# Patient Record
Sex: MALE | Race: BLACK OR AFRICAN AMERICAN | NOT HISPANIC OR LATINO | Employment: OTHER | URBAN - METROPOLITAN AREA
[De-identification: names, ages, dates, MRNs, and addresses within clinical notes are randomized per-mention and may not be internally consistent; named-entity substitution may affect disease eponyms.]

---

## 2018-08-02 ENCOUNTER — HOSPITAL ENCOUNTER (INPATIENT)
Facility: HOSPITAL | Age: 73
LOS: 4 days | Discharge: HOME HEALTH | DRG: 064 | End: 2018-08-06
Attending: EMERGENCY MEDICINE | Admitting: PSYCHIATRY & NEUROLOGY
Payer: MEDICARE

## 2018-08-02 DIAGNOSIS — B20 HIV INFECTION: ICD-10-CM

## 2018-08-02 DIAGNOSIS — I63.411 EMBOLIC STROKE INVOLVING RIGHT MIDDLE CEREBRAL ARTERY: ICD-10-CM
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
## 2018-08-02 DIAGNOSIS — R42 DIZZINESS: ICD-10-CM

## 2018-08-02 DIAGNOSIS — G93.6 CYTOTOXIC CEREBRAL EDEMA: ICD-10-CM

## 2018-08-02 DIAGNOSIS — I10 ESSENTIAL HYPERTENSION: ICD-10-CM

## 2018-08-02 DIAGNOSIS — I10 HYPERTENSION, UNSPECIFIED TYPE: ICD-10-CM

## 2018-08-02 DIAGNOSIS — R55 SYNCOPE: ICD-10-CM

## 2018-08-02 LAB
ALBUMIN SERPL BCP-MCNC: 3.3 G/DL
ALP SERPL-CCNC: 68 U/L
ALT SERPL W/O P-5'-P-CCNC: 12 U/L
ANION GAP SERPL CALC-SCNC: 9 MMOL/L
AST SERPL-CCNC: 17 U/L
BASOPHILS # BLD AUTO: 0.05 K/UL
BASOPHILS NFR BLD: 1 %
BILIRUB SERPL-MCNC: 0.3 MG/DL
BUN SERPL-MCNC: 9 MG/DL
CALCIUM SERPL-MCNC: 8.8 MG/DL
CHLORIDE SERPL-SCNC: 109 MMOL/L
CO2 SERPL-SCNC: 22 MMOL/L
CREAT SERPL-MCNC: 2 MG/DL
DIFFERENTIAL METHOD: ABNORMAL
EOSINOPHIL # BLD AUTO: 0.2 K/UL
EOSINOPHIL NFR BLD: 3.5 %
ERYTHROCYTE [DISTWIDTH] IN BLOOD BY AUTOMATED COUNT: 14.5 %
EST. GFR  (AFRICAN AMERICAN): 37.7 ML/MIN/1.73 M^2
EST. GFR  (NON AFRICAN AMERICAN): 32.6 ML/MIN/1.73 M^2
GLUCOSE SERPL-MCNC: 75 MG/DL
HCT VFR BLD AUTO: 45.2 %
HGB BLD-MCNC: 15.2 G/DL
IMM GRANULOCYTES # BLD AUTO: 0.01 K/UL
IMM GRANULOCYTES NFR BLD AUTO: 0.2 %
INR PPP: 1
LYMPHOCYTES # BLD AUTO: 2.2 K/UL
LYMPHOCYTES NFR BLD: 43.4 %
MCH RBC QN AUTO: 29.2 PG
MCHC RBC AUTO-ENTMCNC: 33.6 G/DL
MCV RBC AUTO: 87 FL
MONOCYTES # BLD AUTO: 0.6 K/UL
MONOCYTES NFR BLD: 10.7 %
NEUTROPHILS # BLD AUTO: 2.1 K/UL
NEUTROPHILS NFR BLD: 41.2 %
NRBC BLD-RTO: 0 /100 WBC
PLATELET # BLD AUTO: 263 K/UL
PMV BLD AUTO: 8.5 FL
POTASSIUM SERPL-SCNC: 3.9 MMOL/L
PROT SERPL-MCNC: 7.1 G/DL
PROTHROMBIN TIME: 10.6 SEC
RBC # BLD AUTO: 5.2 M/UL
SODIUM SERPL-SCNC: 140 MMOL/L
TROPONIN I SERPL DL<=0.01 NG/ML-MCNC: 0.02 NG/ML
WBC # BLD AUTO: 5.14 K/UL

## 2018-08-02 PROCEDURE — 85025 COMPLETE CBC W/AUTO DIFF WBC: CPT

## 2018-08-02 PROCEDURE — 99285 EMERGENCY DEPT VISIT HI MDM: CPT | Mod: ,,, | Performed by: EMERGENCY MEDICINE

## 2018-08-02 PROCEDURE — 93005 ELECTROCARDIOGRAM TRACING: CPT

## 2018-08-02 PROCEDURE — 84484 ASSAY OF TROPONIN QUANT: CPT

## 2018-08-02 PROCEDURE — 12000002 HC ACUTE/MED SURGE SEMI-PRIVATE ROOM

## 2018-08-02 PROCEDURE — 99285 EMERGENCY DEPT VISIT HI MDM: CPT | Mod: 25

## 2018-08-02 PROCEDURE — 85610 PROTHROMBIN TIME: CPT

## 2018-08-02 PROCEDURE — 93010 ELECTROCARDIOGRAM REPORT: CPT | Mod: ,,, | Performed by: INTERNAL MEDICINE

## 2018-08-02 PROCEDURE — 80053 COMPREHEN METABOLIC PANEL: CPT

## 2018-08-02 RX ORDER — MECLIZINE HYDROCHLORIDE CHEWABLE TABLETS 25 MG/1
12.5 TABLET, CHEWABLE ORAL 3 TIMES DAILY PRN
COMMUNITY

## 2018-08-03 PROBLEM — I63.411 EMBOLIC STROKE INVOLVING RIGHT MIDDLE CEREBRAL ARTERY: Status: ACTIVE | Noted: 2018-08-03

## 2018-08-03 PROBLEM — G93.6 CYTOTOXIC CEREBRAL EDEMA: Status: ACTIVE | Noted: 2018-08-03

## 2018-08-03 LAB
APTT BLDCRRT: 26 SEC
BILIRUB UR QL STRIP: NEGATIVE
CHOLEST SERPL-MCNC: 234 MG/DL
CHOLEST/HDLC SERPL: 5.3 {RATIO}
CK MB SERPL-MCNC: 0.6 NG/ML
CK MB SERPL-RTO: 0.8 %
CK SERPL-CCNC: 78 U/L
CLARITY UR REFRACT.AUTO: CLEAR
COLOR UR AUTO: YELLOW
DIASTOLIC DYSFUNCTION: NO
ESTIMATED AVG GLUCOSE: 105 MG/DL
ESTIMATED PA SYSTOLIC PRESSURE: 23.98
GLUCOSE UR QL STRIP: NEGATIVE
HBA1C MFR BLD HPLC: 5.3 %
HDLC SERPL-MCNC: 44 MG/DL
HDLC SERPL: 18.8 %
HGB UR QL STRIP: NEGATIVE
INR PPP: 1
KETONES UR QL STRIP: NEGATIVE
LDLC SERPL CALC-MCNC: 159 MG/DL
LEUKOCYTE ESTERASE UR QL STRIP: NEGATIVE
MAGNESIUM SERPL-MCNC: 2 MG/DL
MITRAL VALVE MOBILITY: NORMAL
NITRITE UR QL STRIP: NEGATIVE
NONHDLC SERPL-MCNC: 190 MG/DL
PH UR STRIP: 7 [PH] (ref 5–8)
PHOSPHATE SERPL-MCNC: 2.2 MG/DL
POTASSIUM SERPL-SCNC: 4 MMOL/L
PROT UR QL STRIP: NEGATIVE
PROTHROMBIN TIME: 10.6 SEC
RETIRED EF AND QEF - SEE NOTES: 53 (ref 55–65)
SP GR UR STRIP: 1.01 (ref 1–1.03)
TRIGL SERPL-MCNC: 155 MG/DL
TROPONIN I SERPL DL<=0.01 NG/ML-MCNC: <0.006 NG/ML
TSH SERPL DL<=0.005 MIU/L-ACNC: 0.95 UIU/ML
URN SPEC COLLECT METH UR: NORMAL
UROBILINOGEN UR STRIP-ACNC: NEGATIVE EU/DL

## 2018-08-03 PROCEDURE — 25000003 PHARM REV CODE 250: Performed by: NURSE PRACTITIONER

## 2018-08-03 PROCEDURE — 20600001 HC STEP DOWN PRIVATE ROOM

## 2018-08-03 PROCEDURE — 85730 THROMBOPLASTIN TIME PARTIAL: CPT

## 2018-08-03 PROCEDURE — 97161 PT EVAL LOW COMPLEX 20 MIN: CPT

## 2018-08-03 PROCEDURE — 99222 1ST HOSP IP/OBS MODERATE 55: CPT | Mod: ,,, | Performed by: NURSE PRACTITIONER

## 2018-08-03 PROCEDURE — 82550 ASSAY OF CK (CPK): CPT

## 2018-08-03 PROCEDURE — 92610 EVALUATE SWALLOWING FUNCTION: CPT

## 2018-08-03 PROCEDURE — A9585 GADOBUTROL INJECTION: HCPCS | Performed by: EMERGENCY MEDICINE

## 2018-08-03 PROCEDURE — 93306 TTE W/DOPPLER COMPLETE: CPT | Mod: 26,,, | Performed by: INTERNAL MEDICINE

## 2018-08-03 PROCEDURE — 83036 HEMOGLOBIN GLYCOSYLATED A1C: CPT

## 2018-08-03 PROCEDURE — 97112 NEUROMUSCULAR REEDUCATION: CPT

## 2018-08-03 PROCEDURE — G8996 SWALLOW CURRENT STATUS: HCPCS | Mod: CJ

## 2018-08-03 PROCEDURE — G8978 MOBILITY CURRENT STATUS: HCPCS | Mod: CJ

## 2018-08-03 PROCEDURE — 25500020 PHARM REV CODE 255: Performed by: EMERGENCY MEDICINE

## 2018-08-03 PROCEDURE — G8979 MOBILITY GOAL STATUS: HCPCS | Mod: CI

## 2018-08-03 PROCEDURE — 84443 ASSAY THYROID STIM HORMONE: CPT

## 2018-08-03 PROCEDURE — G8987 SELF CARE CURRENT STATUS: HCPCS | Mod: CK

## 2018-08-03 PROCEDURE — 92523 SPEECH SOUND LANG COMPREHEN: CPT

## 2018-08-03 PROCEDURE — A4216 STERILE WATER/SALINE, 10 ML: HCPCS | Performed by: NURSE PRACTITIONER

## 2018-08-03 PROCEDURE — G8988 SELF CARE GOAL STATUS: HCPCS | Mod: CJ

## 2018-08-03 PROCEDURE — G8997 SWALLOW GOAL STATUS: HCPCS | Mod: CH

## 2018-08-03 PROCEDURE — 84100 ASSAY OF PHOSPHORUS: CPT

## 2018-08-03 PROCEDURE — 97165 OT EVAL LOW COMPLEX 30 MIN: CPT

## 2018-08-03 PROCEDURE — G8980 MOBILITY D/C STATUS: HCPCS | Mod: CJ

## 2018-08-03 PROCEDURE — 99223 1ST HOSP IP/OBS HIGH 75: CPT | Mod: AI,,, | Performed by: PSYCHIATRY & NEUROLOGY

## 2018-08-03 PROCEDURE — 36415 COLL VENOUS BLD VENIPUNCTURE: CPT

## 2018-08-03 PROCEDURE — 93306 TTE W/DOPPLER COMPLETE: CPT

## 2018-08-03 PROCEDURE — 85610 PROTHROMBIN TIME: CPT

## 2018-08-03 PROCEDURE — 83735 ASSAY OF MAGNESIUM: CPT

## 2018-08-03 PROCEDURE — 80061 LIPID PANEL: CPT

## 2018-08-03 PROCEDURE — 81003 URINALYSIS AUTO W/O SCOPE: CPT

## 2018-08-03 PROCEDURE — 63600175 PHARM REV CODE 636 W HCPCS: Performed by: NURSE PRACTITIONER

## 2018-08-03 PROCEDURE — 84132 ASSAY OF SERUM POTASSIUM: CPT

## 2018-08-03 PROCEDURE — 84484 ASSAY OF TROPONIN QUANT: CPT

## 2018-08-03 PROCEDURE — 82553 CREATINE MB FRACTION: CPT

## 2018-08-03 RX ORDER — HEPARIN SODIUM 5000 [USP'U]/ML
5000 INJECTION, SOLUTION INTRAVENOUS; SUBCUTANEOUS EVERY 8 HOURS
Status: DISCONTINUED | OUTPATIENT
Start: 2018-08-03 | End: 2018-08-06 | Stop reason: HOSPADM

## 2018-08-03 RX ORDER — ASPIRIN 81 MG/1
81 TABLET ORAL DAILY
Status: DISCONTINUED | OUTPATIENT
Start: 2018-08-03 | End: 2018-08-06 | Stop reason: HOSPADM

## 2018-08-03 RX ORDER — GADOBUTROL 604.72 MG/ML
8 INJECTION INTRAVENOUS
Status: COMPLETED | OUTPATIENT
Start: 2018-08-03 | End: 2018-08-03

## 2018-08-03 RX ORDER — ACETAMINOPHEN 325 MG/1
650 TABLET ORAL EVERY 6 HOURS PRN
Status: DISCONTINUED | OUTPATIENT
Start: 2018-08-03 | End: 2018-08-06 | Stop reason: HOSPADM

## 2018-08-03 RX ORDER — ATORVASTATIN CALCIUM 40 MG/1
40 TABLET, FILM COATED ORAL DAILY
Qty: 30 TABLET | Refills: 1 | Status: SHIPPED | OUTPATIENT
Start: 2018-08-04 | End: 2018-08-06 | Stop reason: HOSPADM

## 2018-08-03 RX ORDER — SODIUM CHLORIDE 0.9 % (FLUSH) 0.9 %
3 SYRINGE (ML) INJECTION EVERY 8 HOURS
Status: DISCONTINUED | OUTPATIENT
Start: 2018-08-03 | End: 2018-08-06 | Stop reason: HOSPADM

## 2018-08-03 RX ORDER — ATORVASTATIN CALCIUM 20 MG/1
40 TABLET, FILM COATED ORAL DAILY
Status: DISCONTINUED | OUTPATIENT
Start: 2018-08-03 | End: 2018-08-06

## 2018-08-03 RX ORDER — SODIUM,POTASSIUM PHOSPHATES 280-250MG
1 POWDER IN PACKET (EA) ORAL ONCE
Status: DISCONTINUED | OUTPATIENT
Start: 2018-08-03 | End: 2018-08-06 | Stop reason: HOSPADM

## 2018-08-03 RX ORDER — FERROUS SULFATE 325(65) MG
325 TABLET, DELAYED RELEASE (ENTERIC COATED) ORAL DAILY
Status: DISCONTINUED | OUTPATIENT
Start: 2018-08-03 | End: 2018-08-06 | Stop reason: HOSPADM

## 2018-08-03 RX ORDER — DARUNAVIR 800 MG/1
800 TABLET, FILM COATED ORAL
Status: DISCONTINUED | OUTPATIENT
Start: 2018-08-03 | End: 2018-08-06 | Stop reason: HOSPADM

## 2018-08-03 RX ORDER — LABETALOL HYDROCHLORIDE 5 MG/ML
10 INJECTION, SOLUTION INTRAVENOUS EVERY 6 HOURS PRN
Status: DISCONTINUED | OUTPATIENT
Start: 2018-08-03 | End: 2018-08-06 | Stop reason: HOSPADM

## 2018-08-03 RX ORDER — TENOFOVIR DISOPROXIL FUMARATE 300 MG/1
300 TABLET, FILM COATED ORAL
Status: DISCONTINUED | OUTPATIENT
Start: 2018-08-03 | End: 2018-08-06 | Stop reason: HOSPADM

## 2018-08-03 RX ORDER — ASPIRIN 81 MG/1
81 TABLET ORAL DAILY
Refills: 0 | COMMUNITY
Start: 2018-08-04 | End: 2019-08-04

## 2018-08-03 RX ORDER — SULFAMETHOXAZOLE AND TRIMETHOPRIM 800; 160 MG/1; MG/1
1 TABLET ORAL
Status: DISCONTINUED | OUTPATIENT
Start: 2018-08-03 | End: 2018-08-06 | Stop reason: HOSPADM

## 2018-08-03 RX ADMIN — ACETAMINOPHEN 650 MG: 325 TABLET, FILM COATED ORAL at 10:08

## 2018-08-03 RX ADMIN — GADOBUTROL 8 ML: 604.72 INJECTION INTRAVENOUS at 01:08

## 2018-08-03 RX ADMIN — Medication 3 ML: at 06:08

## 2018-08-03 RX ADMIN — TENOFOVIR DISOPROXIL FUMARATE 300 MG: 300 TABLET, FILM COATED ORAL at 04:08

## 2018-08-03 RX ADMIN — Medication 3 ML: at 02:08

## 2018-08-03 RX ADMIN — ACETAMINOPHEN 650 MG: 325 TABLET, FILM COATED ORAL at 04:08

## 2018-08-03 RX ADMIN — HEPARIN SODIUM 5000 UNITS: 5000 INJECTION, SOLUTION INTRAVENOUS; SUBCUTANEOUS at 10:08

## 2018-08-03 RX ADMIN — HEPARIN SODIUM 5000 UNITS: 5000 INJECTION, SOLUTION INTRAVENOUS; SUBCUTANEOUS at 02:08

## 2018-08-03 NOTE — PLAN OF CARE
08/03/2018      Benjamin GOOD Garcia  [address] [facility] [address]          Vascular Neurology Dept.  [facility] [address] [facility] [address] [phone] after hours   Diagnosis:  Embolic stroke involving right middle cerebral artery                                Please evaluate and treat patient for his PT/OT and speech therapy needs.           __________________________  Faith Henry MD  08/03/2018

## 2018-08-03 NOTE — PT/OT/SLP EVAL
Speech Language Pathology Evaluation  Cognitive/Bedside Swallow    Patient Name:  Benjamin Zelaya   MRN:  [id number]  Admitting Diagnosis: Embolic stroke involving right middle cerebral artery    Recommendations:                  General Recommendations:  Dysphagia therapy and Cognitive-linguistic therapy  Diet recommendations:  Dental Soft, Thin   Aspiration Precautions: 1 bite/sip at a time, Alternating bites/sips, Check for pocketing/oral residue, Frequent oral care, HOB to 90 degrees, Meds whole 1 at a time, Remain upright 30 minutes post meal, Small bites/sips and Strict aspiration precautions Continue to monitor for signs and symptoms of aspiration and discontinue oral feeding should you notice any of the following: watery eyes, reddened facial area, wet vocal quality, increased work of breathing, change in respiratory status, increased congestion, coughing, fever, and.or confusion.   General Precautions: Standard, aspiration, fall, vision impaired  Communication strategies:  Go to room if call light pushed     History:     Past Medical History:   Diagnosis Date    Chronic kidney disease     HIV infection     HTN (hypertension) 10/24/2013    Stroke     2012       History reviewed. No pertinent surgical history.    Social History: Patient lives alone in home. Grandson lives across the street and checks on him frequently per Grandson report.     Chest X-Rays: 8/03/2018: No acute findings.    No significant change from prior study.    MRI 8/3/2018: Multiple acute to subacute infarcts in a right MCA distribution with partially occluding thrombus identified in the petrous segment of the right ICA.  Findings suggest embolic infarcts in the right MCA distribution.    Small focus of gyral enhancement in the right frontal lobe within the infarct, likely secondary to blood-barrier breakdown from recent infarct.    Advanced chronic microvascular ischemic change throughout the supratentorial white matter.    Prior diet:  "Regular, thin     Occupation/hobbies/homemaking: Retired, Teacher.    Subjective     SLP reviewed Pt with nurse, nurse reports Pt lethargic, has not had medications this am  Pt found awake and alert  He explains, "My Grandson said my speech was slurred so he brought me in"  He denies pain    Pain/Comfort:  · Pain Rating 1: 0/10  · Pain Rating Post-Intervention 1: 0/10    Objective:     Cognitive Status:    Attention Sustained attention deficit noted, decreased L attention   Perseveration Not present  Orientation Oriented x4  Memory Immediate Recall WFL for 3 related items, Delayed STM recall intact for recall of 3 related items post 3 minute filled delay  and long term recall intact  [employer] for ADL tasks provided 90% of the time, I'ly  Safety awareness :Decreased isnight into deficits, denial of deficits noted  Managing finances : Impaired, moderate. Patient requires MOD verbal cueing to complete fx word-problem for money mngt   Simple calculation intact for addition/subtraction (1 -digt)   Reasoning Numeric reasoning Time      Receptive Language:   Comprehension:      WFL for m/u commands and m/u YNQ this service day.     Pragmatics:    Eye contact decreased, L inattention    Expressive Language:  Verbal:    Repetition Words WFl and Phrases MIldly decerased accuracy   Naming Confrontation WFL, Convergent WFL and Single word responsive naming WFL  Sentence formulation no word-finding difficulty noted at the sentence level   Conversational speech No word-finding difficulty noted at the conversational level or during picture description tasks (cookie theft)  Nonverbal:   DNA      Motor Speech:  Mild Dysarthria as c/b decreased articulaory precision and breath support at the sentence level+     Voice:   Clear, mildly decreased intensity adn breath support     Visual-Spatial:  Impaired, L inattention, furhter assessment warranted    Reading:   Pt complete simple fx reading tasks with larger font WFL, " ongoing assessmnet warranted when reading glasses present     Written Expression:   DNA, reading glasses not present, ongoing assessment warranted when glasses present    Oral Musculature Evaluation  · Oral Musculature: facial asymmetry present  · Dentition: present and adequate  · Mucosal Quality: adequate  · Mandibular Strength and Mobility: impaired  · Oral Labial Strength and Mobility: impaired retraction  · Lingual Strength and Mobility: impaired left lateral movement, functional anterior elevation, functional strength  · Volitional Cough: elicited, strong   · Volitional Swallow: elicited, timely   · Voice Prior to PO Intake: clear, low intensity, Dysarthric     Bedside Swallow Eval:   Consistencies Assessed:  · Thin liquids cup edge sips thin liquids x5  · Puree tsp bites x3  · Solids bites of cracker x2     Oral Phase:   · Prolonged mastication    Pharyngeal Phase:   · no overt clinical signs/symptoms of aspiration    Compensatory Strategies  · None    Treatment: Patient and Grandson educated on SLP role, S/S aspiration, aspiration precautions, fall precautions (call light,) tx recs and diet recommendations. Patient and Grandson v/u. No additional questions noted. Whiteboard updated. Findings reviewed with MD team following session. Attempted to notify nurse.     Assessment:     Benjamin Zelaya is a 73 y.o. male with an SLP diagnosis of MIld Oral Dysphagia, Dysarthria and MIld Higher-level Cognitive Impairment. .  He would benefit from ongoing OP ST upon d/c from acute to continue to improve speech and safety awareness.     Goals:    SLP Goals        Problem: SLP Goal    Goal Priority Disciplines Outcome   SLP Goal     SLP Ongoing (interventions implemented as appropriate)   Description:  Speech Language Pathology Goals  Goals expected to be met by 8/10/18  1. Pt will tolerate dental soft diet with thin liquids w/o overt S/S aspiration, MOD I  2. Pt will tolerate trials of advanced textures with adequate oral  clearance and no overt S/S aspiration, MOD I  3. Pt will complete OMEs x10 ea with good effort 90% of attempts, MOD I, to improve labial/mandibular strength and coordination  4. Pt will complete sentence repetition tasks with 90% intelligibility, MOD I, to improve speech   5. Pt will complete fx math tasks with 90% accuracy, Supervision  6. Pt will complete basic visiospatial tasks with 90% accuracy, Supervision  7. Pt will complete further assessment of reading and writing skills   8. Educate Pt and family on safety precautions and S/S aspiration                         Plan:     · Patient to be seen:  4 x/week   · Plan of Care expires:  09/02/18  · Plan of Care reviewed with:  patient, grandchild(jj)   · SLP Follow-Up:  Yes       Discharge recommendations:  Discharge Facility/Level Of Care Needs: outpatient speech therapy   Barriers to Discharge:  None    Time Tracking:     SLP Treatment Date:   08/03/18  Speech Start Time:  1155  Speech Stop Time:  1225     Speech Total Time (min):  30 min    Billable Minutes: Eval 18  and Eval Swallow and Oral Function 12    RAFAEL Castillo, [facility]-SLP  Speech-Language Pathology  Pager: [phone]      08/03/2018

## 2018-08-03 NOTE — CONSULTS
[facility]  Physical Medicine & Rehab  Consult Note    Patient Name: Benjamin Zelaya  MRN: [id number]  Admission Date: 8/2/2018  Hospital Length of Stay: 0 days  Attending Physician: Faith Henry MD     Inpatient consult to Physical Medicine & Rehabilitation  Consult performed by: Chloe Bang NP  Consult requested by:  Faith Henry MD    Collaborating Physician: Barrett Fountain MD  Reason for Consult:  assess rehabilitation needs    Consults  Subjective:     Principal Problem: Embolic stroke involving right middle cerebral artery    HPI: Benjamin Zelaya is a 73-year-old male with PMHx of HTN, CKD, CVA with residual left-sided deficits, and HIV.  Patient presented to [facility] on 8/2/18 for general weakness, dizziness, and gait instability x 1 week with subsequent falls, slurred speech x 2 days, and new onset left-sided facial droop.  On arrival, evaluated by Vascular Neurology.  CTH without acute pathology.  Not tPA candidate 2/2 outside of treatment window.  MRI brain revealed acute/subacute R MCA infarct, suggestive of embolic etiology.      Functional History: Patient lives in [place], alone (family lives across the street), in a single story home with 3 steps to enter.  Prior to admission, he was (I) with ADLs, including driving, and mobility.  IRF for several months after previous stroke.  He is right handed.  DME: none.    Hospital Course: 8/3/18:  Therapy pending.     Past Medical History:   Diagnosis Date    Chronic kidney disease     HIV infection     HTN (hypertension) 10/24/2013    Stroke     2012     History reviewed. No pertinent surgical history.  Review of patient's allergies indicates:  No Known Allergies    Scheduled Medications:    aspirin  81 mg Oral Daily    atorvastatin  40 mg Oral Daily    darunavir ethanolate  800 mg Oral Daily with breakfast    ferrous sulfate  325 mg Oral Daily    heparin (porcine)  5,000 Units Subcutaneous Q8H    potassium, sodium phosphates   1 packet Oral Once    sodium chloride 0.9%  3 mL Intravenous Q8H    sulfamethoxazole-trimethoprim 800-160mg  1 tablet Oral Once per day on Mon Wed Fri    tenofovir  300 mg Oral Q48H       PRN Medications: acetaminophen, labetalol, sodium chloride 0.9%    Family History     Problem Relation (Age of Onset)    Hypertension Father, Brother, Paternal Grandmother        Social History Main Topics    Smoking status: Former Smoker     Packs/day: 0.25     Years: 20.00     Quit date: 9/18/1999    Smokeless tobacco: Never Used    Alcohol use No    Drug use: No    Sexual activity: Not Currently     Partners: Female, Male     Birth control/ protection: Condom     Review of Systems   Constitutional: Negative for chills, fatigue and fever.   HENT: Negative for drooling, hearing loss, trouble swallowing and voice change.    Eyes: Negative for pain and visual disturbance.   Respiratory: Negative for cough, shortness of breath and wheezing.    Cardiovascular: Negative for chest pain and palpitations.   Gastrointestinal: Negative for abdominal pain, nausea and vomiting.   Genitourinary: Negative for difficulty urinating and flank pain.   Musculoskeletal: Negative for arthralgias, back pain, myalgias and neck pain.   Skin: Negative for rash and wound.   Neurological: Positive for weakness. Negative for dizziness, numbness and headaches.   Psychiatric/Behavioral: Negative for agitation and hallucinations. The patient is not nervous/anxious.      Objective:     Vital Signs (Most Recent):  Temp: 98.7 °F (37.1 °C) (08/03/18 0721)  Pulse: (!) 56 (08/03/18 0721)  Resp: 18 (08/03/18 0721)  BP: (!) 171/89 (08/03/18 0721)  SpO2: (!) 93 % (08/03/18 0721)    Vital Signs (24h Range):  Temp:  [98.7 °F (37.1 °C)-99 °F (37.2 °C)] 98.7 °F (37.1 °C)  Pulse:  [56-79] 56  Resp:  [18-20] 18  SpO2:  [93 %-99 %] 93 %  BP: (125-179)/(78-96) 171/89     Body mass index is 24.81 kg/m².    Physical Exam   Constitutional: He is oriented to person, place,  and time. He appears well-developed and well-nourished. No distress.   HENT:   Head: Normocephalic and atraumatic.   Right Ear: External ear normal.   Left Ear: External ear normal.   Nose: Nose normal.   Eyes: Right eye exhibits no discharge. Left eye exhibits no discharge. No scleral icterus.   Neck: Normal range of motion.   Cardiovascular: Normal rate, regular rhythm and intact distal pulses.    Pulmonary/Chest: Effort normal. No respiratory distress. He has no wheezes.   Abdominal: Soft. He exhibits no distension. There is no tenderness.   Musculoskeletal: Normal range of motion. He exhibits no edema or tenderness.   Neurological: He is alert and oriented to person, place, and time. He exhibits normal muscle tone.   -  Mental Status:  AAOx3.  Follows commands.  Answers correct age and .  Recent and remote memory intact.  -  Speech and language:  no aphasia or dysarthria.    -  Vision:  no hemianopsia or ptosis.    -  Facial movement (CN VII): symmetrical.  -  Motor:  RUE: 5/5.  LUE: 4/5.  RLE: 5/5.  LLE: 5-/5.  -  Tone:  Increased LUE.  -  Sensory:  Intact to light touch and pin prick.   Skin: Skin is warm and dry. No rash noted.   Psychiatric: He has a normal mood and affect. His behavior is normal. Thought content normal.   Vitals reviewed.    Diagnostic Results:   Labs: Reviewed  X-Ray: Reviewed  CT: Reviewed  MRI: Reviewed    Assessment/Plan:     * Embolic stroke involving right middle cerebral artery    -  Presented for general weakness, dizziness, and gait instability x 1 week with subsequent falls, slurred speech x 2 days, and new onset left-sided facial droop  -  CTH without acute pathology--> not tPA candidate 2/2 outside of treatment window  -  MRI brain revealed acute/subacute R MCA infarct, suggestive of embolic etiology  See hospital course for functional, cognitive/speech/language, and nutrition/swallow status.      Recommendations  -  Encourage mobility, OOB in chair, and early ambulation as  appropriate   -  PT/OT evaluate and treat  -  SLP speech and cognitive evaluate and treat  -  Monitor mood and sleep disturbances  -  Establish consistent sleep-wake cycle  -  Monitor for bowel and bladder dysfunction  -  Monitor for shoulder pain and subluxation  -  Monitor for spasticity  -  DVT prophylaxis  -  Monitor for and prevent skin breakdown and pressure ulcers  · Early mobility, repositioning/weight shifting every 20-30 minutes when sitting, turn patient every 2 hours, proper mattress/overlay and chair cushioning, pressure relief/heel protector boots        Cytotoxic cerebral edema    -  2/2 stroke        HTN (hypertension)    -  Stroke risk factor        HIV infection    -  Stroke risk factor  -  On home meds        Therapy evaluations pending.  Will follow progress for final post-acute/rehab recommendation.    Thank you for your consult.     ANIKET Valdez  Department of Physical Medicine & Rehab  [facility]

## 2018-08-03 NOTE — SUBJECTIVE & OBJECTIVE
Past Medical History:   Diagnosis Date    Chronic kidney disease     HIV infection     HTN (hypertension) 10/24/2013    Stroke     2012     History reviewed. No pertinent surgical history.  Review of patient's allergies indicates:  No Known Allergies    Scheduled Medications:    aspirin  81 mg Oral Daily    atorvastatin  40 mg Oral Daily    darunavir ethanolate  800 mg Oral Daily with breakfast    ferrous sulfate  325 mg Oral Daily    heparin (porcine)  5,000 Units Subcutaneous Q8H    potassium, sodium phosphates  1 packet Oral Once    sodium chloride 0.9%  3 mL Intravenous Q8H    sulfamethoxazole-trimethoprim 800-160mg  1 tablet Oral Once per day on Mon Wed Fri    tenofovir  300 mg Oral Q48H       PRN Medications: acetaminophen, labetalol, sodium chloride 0.9%    Family History     Problem Relation (Age of Onset)    Hypertension Father, Brother, Paternal Grandmother        Social History Main Topics    Smoking status: Former Smoker     Packs/day: 0.25     Years: 20.00     Quit date: 9/18/1999    Smokeless tobacco: Never Used    Alcohol use No    Drug use: No    Sexual activity: Not Currently     Partners: Female, Male     Birth control/ protection: Condom     Review of Systems   Constitutional: Negative for chills, fatigue and fever.   HENT: Negative for drooling, hearing loss, trouble swallowing and voice change.    Eyes: Negative for pain and visual disturbance.   Respiratory: Negative for cough, shortness of breath and wheezing.    Cardiovascular: Negative for chest pain and palpitations.   Gastrointestinal: Negative for abdominal pain, nausea and vomiting.   Genitourinary: Negative for difficulty urinating and flank pain.   Musculoskeletal: Negative for arthralgias, back pain, myalgias and neck pain.   Skin: Negative for rash and wound.   Neurological: Positive for weakness. Negative for dizziness, numbness and headaches.   Psychiatric/Behavioral: Negative for agitation and hallucinations. The  patient is not nervous/anxious.      Objective:     Vital Signs (Most Recent):  Temp: 98.7 °F (37.1 °C) (18)  Pulse: (!) 56 (18)  Resp: 18 (18)  BP: (!) 171/89 (18)  SpO2: (!) 93 % (18)    Vital Signs (24h Range):  Temp:  [98.7 °F (37.1 °C)-99 °F (37.2 °C)] 98.7 °F (37.1 °C)  Pulse:  [56-79] 56  Resp:  [18-20] 18  SpO2:  [93 %-99 %] 93 %  BP: (125-179)/(78-96) 171/89     Body mass index is 24.81 kg/m².    Physical Exam   Constitutional: He is oriented to person, place, and time. He appears well-developed and well-nourished. No distress.   HENT:   Head: Normocephalic and atraumatic.   Right Ear: External ear normal.   Left Ear: External ear normal.   Nose: Nose normal.   Eyes: Right eye exhibits no discharge. Left eye exhibits no discharge. No scleral icterus.   Neck: Normal range of motion.   Cardiovascular: Normal rate, regular rhythm and intact distal pulses.    Pulmonary/Chest: Effort normal. No respiratory distress. He has no wheezes.   Abdominal: Soft. He exhibits no distension. There is no tenderness.   Musculoskeletal: Normal range of motion. He exhibits no edema or tenderness.   Neurological: He is alert and oriented to person, place, and time. He exhibits normal muscle tone.   -  Mental Status:  AAOx3.  Follows commands.  Answers correct age and .  Recent and remote memory intact.  -  Speech and language:  no aphasia or dysarthria.    -  Vision:  no hemianopsia or ptosis.    -  Facial movement (CN VII): symmetrical.  -  Motor:  RUE: 5/5.  LUE: 4/5.  RLE: 5/5.  LLE: 5-/5.  -  Tone:  Increased LUE.  -  Sensory:  Intact to light touch and pin prick.   Skin: Skin is warm and dry. No rash noted.   Psychiatric: He has a normal mood and affect. His behavior is normal. Thought content normal.   Vitals reviewed.    NEUROLOGICAL EXAMINATION:     MENTAL STATUS   Oriented to person, place, and time.       Diagnostic Results:   Labs: Reviewed  X-Ray:  Reviewed  CT: Reviewed  MRI: Reviewed

## 2018-08-03 NOTE — ASSESSMENT & PLAN NOTE
-  Presented for general weakness, dizziness, and gait instability x 1 week with subsequent falls, slurred speech x 2 days, and new onset left-sided facial droop  -  CTH without acute pathology--> not tPA candidate 2/2 outside of treatment window  -  MRI brain revealed acute/subacute R MCA infarct, suggestive of embolic etiology  See hospital course for functional, cognitive/speech/language, and nutrition/swallow status.      Recommendations  -  Encourage mobility, OOB in chair, and early ambulation as appropriate   -  PT/OT evaluate and treat  -  SLP speech and cognitive evaluate and treat  -  Monitor mood and sleep disturbances  -  Establish consistent sleep-wake cycle  -  Monitor for bowel and bladder dysfunction  -  Monitor for shoulder pain and subluxation  -  Monitor for spasticity  -  DVT prophylaxis  -  Monitor for and prevent skin breakdown and pressure ulcers  · Early mobility, repositioning/weight shifting every 20-30 minutes when sitting, turn patient every 2 hours, proper mattress/overlay and chair cushioning, pressure relief/heel protector boots

## 2018-08-03 NOTE — ED PROVIDER NOTES
Encounter Date: 8/2/2018    SCRIBE #1 NOTE: I, Rodrigo Richter, am scribing for, and in the presence of,  Dr. Shearer. I have scribed the following portions of the note - the Resident attestation and the EKG reading.       History     Chief Complaint   Patient presents with    Dizziness     dizziness with multiple falls over the last few days.      HPI   73M with h/o CKD, HIV, HTN s/p CVA with L hand motor deficits brought in by family for weakness and falls x 1 weak, unknown LOC, slurred speech 2 days ago and new onset L-sided facial droop today. Denies vertiginous symptoms. Grandchildren at bedside but appears he lives alone.    Review of patient's allergies indicates:  No Known Allergies  Past Medical History:   Diagnosis Date    Chronic kidney disease     HIV infection     HTN (hypertension) 10/24/2013    Stroke     2012     History reviewed. No pertinent surgical history.  Family History   Problem Relation Age of Onset    Hypertension Father     Hypertension Brother     Hypertension Paternal Grandmother      Social History   Substance Use Topics    Smoking status: Former Smoker     Packs/day: 0.25     Years: 20.00     Quit date: 9/18/1999    Smokeless tobacco: Never Used    Alcohol use No     Review of Systems   Constitutional: Negative for fatigue and fever.   HENT: Negative for sneezing and sore throat.    Respiratory: Negative for cough and shortness of breath.    Cardiovascular: Negative for chest pain and palpitations.   Gastrointestinal: Negative for diarrhea and nausea.   Genitourinary: Negative for dysuria and flank pain.   Musculoskeletal: Negative for arthralgias and back pain.   Skin: Negative for pallor and rash.   Neurological: Positive for speech difficulty and light-headedness. Negative for tremors and weakness.        Falls   Hematological: Does not bruise/bleed easily.   Psychiatric/Behavioral: Negative for agitation and behavioral problems.       Physical Exam     Initial Vitals  [08/02/18 2104]   BP Pulse Resp Temp SpO2   (!) 158/78 68 20 99 °F (37.2 °C) 98 %      MAP       --         Physical Exam    Nursing note and vitals reviewed.  Constitutional: He appears well-developed and well-nourished. He is not diaphoretic. No distress.   Pleasant non-toxic appearing AA male   HENT:   Head: Normocephalic and atraumatic.   Nose: Nose normal.   Mouth/Throat: Oropharynx is clear and moist.   Eyes: Conjunctivae and EOM are normal. Pupils are equal, round, and reactive to light. Right eye exhibits no discharge. Left eye exhibits no discharge. No scleral icterus.   Neck: Normal range of motion. Neck supple. No thyromegaly present. No tracheal deviation present.   Cardiovascular: Normal rate, regular rhythm, normal heart sounds and intact distal pulses. Exam reveals no friction rub.    No murmur heard.  Pulmonary/Chest: Breath sounds normal. No stridor. No respiratory distress.   Abdominal: Soft. Bowel sounds are normal. He exhibits no distension. There is no tenderness. There is no rebound.   Musculoskeletal: Normal range of motion. He exhibits no edema or tenderness.   Neurological: He is alert and oriented to person, place, and time. A cranial nerve deficit is present.   L sided facial drop sparing L forehead; flattened nasolabial fold; L hand contracted at baseline; no dysdiadokinesis or dysmetria; rapid hand movements within normal limits   Skin: Skin is warm and dry. Capillary refill takes less than 2 seconds. No erythema. No pallor.   Psychiatric: He has a normal mood and affect. Thought content normal.       NIH Stroke Scale: 3    ED Course   Procedures             Labs Reviewed   CBC W/ AUTO DIFFERENTIAL - Abnormal; Notable for the following:        Result Value    MPV 8.5 (*)     All other components within normal limits   COMPREHENSIVE METABOLIC PANEL - Abnormal; Notable for the following:     CO2 22 (*)     Creatinine 2.0 (*)     Albumin 3.3 (*)     eGFR if  37.7 (*)      eGFR if non  32.6 (*)     All other components within normal limits   TROPONIN I   PROTIME-INR     EKG Readings: (Independently Interpreted)   Sinus rhythm. Normal axis. No acute ischemia.       Imaging Results          CT Head Without Contrast (In process)                  Medical Decision Making:   History:   Old Medical Records: I decided to obtain old medical records.  Independently Interpreted Test(s):   I have ordered and independently interpreted EKG Reading(s) - see prior notes  Clinical Tests:   Lab Tests: Reviewed and Ordered  Radiological Study: Reviewed and Ordered  Medical Tests: Reviewed and Ordered    08/03/2018  MDM: 73M c/o lightheadedness, falls, slurred speech and facial droop. Onset of first two sx 1 week ago with last two presenting within last 24-48hours hours. NIHSS 3. Initial ddx included but was not limited to: ACS/MI, metabolic derangement, sx anemia, UTI, pneumonia, ICH/SDH, CVA, TIA, hypoglycemia.   Plan: CBC, CMP, EKG, trop, CXR, BNP, UA, CT Head, d/w Neuro Vasc  Update: Per Neurovasc, obtain MRI with contrast if CT head without e/o acute intracranial hemorrhage  Lupillo Mayer MD  PGY-3 [employer] EM  11:38PM    Update: Labs and EKG unremarkable apart from Cr of 2.0 which is c/w baseline. CT head non-contrast with e/o remote infarct. MRI with e/o new ischemic changes in R MCA distribution and R ICA vascular occlusion. Neuro Vasc consulted for admission.   Lupillo Mayer MD  PGY-3 [employer] EM  8/3/18 2:48AM              Scribe Attestation:   Scribe #1: I performed the above scribed service and the documentation accurately describes the services I performed. I attest to the accuracy of the note.    Attending Attestation:   Physician Attestation Statement for Resident:  As the supervising MD   Physician Attestation Statement: I have personally seen and examined this patient.   I agree with the above history. -:   As the supervising MD I agree with the above PE.    As the supervising MD  I agree with the above treatment, course, plan, and disposition.                       Clinical Impression:   Diagnoses of Dizziness, Syncope, and Fall were pertinent to this visit.                             Lupillo Mayer MD  Resident  08/03/18 0255

## 2018-08-03 NOTE — PLAN OF CARE
Problem: Occupational Therapy Goal  Goal: Occupational Therapy Goal  Goals to be met by: 8/10/18     Patient will increase functional independence with ADLs by performing:    UE Dressing with Supervision.  LE Dressing with Supervision.  Grooming while standing with Supervision.  Toileting from toilet with Supervision for hygiene and clothing management.   Supine to sit with Modified Dickey.  Stand pivot transfers with Modified Dickey.    Outcome: Ongoing (interventions implemented as appropriate)  OT eval completed.

## 2018-08-03 NOTE — CONSULTS
Inpatient consult to Physical Medicine Rehab  Consult performed by: SHILPA HOPKINS  Consult ordered by: MARIA INES DINH  Reason for consult: assess rehab needs      Reviewed patient history and current admission.  Rehab team following.  Full consult to follow.    TAYLER Valdez, FNP-C  Physical Medicine & Rehabilitation   08/03/2018  Spectralink: [id number]

## 2018-08-03 NOTE — HOSPITAL COURSE
8/3/18:  Evaluated by PT, OT, and SLP.  Found to have mild oral dysphagia, dysarthria, mild higher-level cognitive impairment.  SLP recommendation: dental soft diet and thin liquids.  Bed mobility mod(I)/(I)/SV.  Sit to stand SBA and transfers SBA.  Ambulated 100 ft SV.  Ascended and descended 5 stairs SV.  UBD maxA (usually wears pull-over shirts) and LBD SBA.  8/6/18:  Participated with OT.  Bed mobility mod(I).  Sit to stand and transfers mod(I).  UBD SBA and LBD Efrain.  Grooming SV.

## 2018-08-03 NOTE — HPI
Patient is a 73 y.o. male with significant past medical history of stroke 5 yrs ago with left sided deficits, CKD, HIV, and HTN  presented to hospital complaining of multiple falls.  The patient and his family state the patient has been week x 1 week with multiple falls since Wednesday.  2 days ago he had slurred speech, it began Wednesday night.  The patient developed a facial droop ~2130 and subsequently presented to the ED for evaluation.  Of note, the patient lives alone with his dog, Kady.  His family lives across the street and frequently check on him.  He is very reluctant to be admitted to the hospital out of concern for his dog and wanting to eat.  Currently the patient is c/o a HA but has no slurred speech, dizziness, or CP.  He states he has poor vision at baseline and his vision is unchanged.  He also endorses feeling generally weak.  A CTH was obtained in the ED and was negative for acute findings.  An MRI brain was obtained and revealed an acute right MCA distribution infarction.  The patient will be admitted to Vascular Neurology for further evaluation.

## 2018-08-03 NOTE — ASSESSMENT & PLAN NOTE
73 y.o. male with significant past medical history of stroke 5 yrs ago with left sided deficits, CKD, HIV, and HTN  presented to hospital complaining of multiple falls.  MRI revealed acute R MCA distribution infarction.  Patient did not receive tPA.  No intervention.  Symptoms began or worsened on Wednesday night.  He did not present to the ED until Thursday night.    Antithrombotics for secondary stroke prevention: Antiplatelets: Aspirin: 81 mg daily    Statins for secondary stroke prevention and hyperlipidemia, if present:   Statins: Atorvastatin- 40 mg daily    Aggressive risk factor modification: HTN, HIV     Rehab efforts: PT/OT/SLP to evaluate and treat    Diagnostics ordered/pending: HgbA1C to assess blood glucose levels, Lipid Profile to assess cholesterol levels, MRI head without contrast to assess brain parenchyma, TTE to assess cardiac function/status , TSH to assess thyroid function    VTE prophylaxis: Heparin 5000 units SQ every 8 hours    BP parameters: Infarct: No intervention, SBP <220

## 2018-08-03 NOTE — H&P
[facility]  Vascular Neurology  Com[facility] Stroke Center  History & Physical    Inpatient consult to Vascular (Stroke) Neurology  Consult performed by: MARIA INES DINH  Consult ordered by: MARIA INES DINH  Reason for consult: left facial droop, multiple falls        Assessment/Plan:     * Embolic stroke involving right middle cerebral artery    73 y.o. male with significant past medical history of stroke 5 yrs ago with left sided deficits, CKD, HIV, and HTN  presented to hospital complaining of multiple falls.  MRI revealed acute R MCA distribution infarction.  Patient did not receive tPA.  No intervention.  Symptoms began or worsened on Wednesday night.  He did not present to the ED until Thursday night.    Antithrombotics for secondary stroke prevention: Antiplatelets: Aspirin: 81 mg daily    Statins for secondary stroke prevention and hyperlipidemia, if present:   Statins: Atorvastatin- 40 mg daily    Aggressive risk factor modification: HTN, HIV     Rehab efforts: PT/OT/SLP to evaluate and treat    Diagnostics ordered/pending: HgbA1C to assess blood glucose levels, Lipid Profile to assess cholesterol levels, MRI head without contrast to assess brain parenchyma, TTE to assess cardiac function/status , TSH to assess thyroid function    VTE prophylaxis: Heparin 5000 units SQ every 8 hours    BP parameters: Infarct: No intervention, SBP <220            Cytotoxic cerebral edema    Area of cytotoxic cerebral edema identified when reviewing brain imaging in the territory of the right middle cerebral artery. There no mass effect associated with it. We will continue to monitor the patients clinical exam for any worsening of symptoms which may indicate expansion of the stroke or the area of the edema resulting in the clinical change. The pattern is suggestive of embolic etiology.              HTN (hypertension)    -Stroke risk factor.  SBP<220 for now.  -Resume home meds when appropriate         HIV infection    -Stroke risk factor.  Continue home meds.        CKD (chronic kidney disease), stage III    -Hx CKD.  Baseline creatinine 1.8-2.1.  Avoid nephrotoxins if possible.            STROKE DOCUMENTATION          NIH Scale:  Interval: baseline (upon arrival/admit)  1a. Level Of Consciousness: 0-->Alert: keenly responsive  1b. LOC Questions: 0-->Answers both questions correctly  1c. LOC Commands: 0-->Performs both tasks correctly  2. Best Gaze: 0-->Normal  3. Visual: 1-->Partial hemianopia (left)  4. Facial Palsy: 2-->Partial paralysis (total or near-total paralysis of lower face) (left, new symptom for patient)  5a. Motor Arm, Left: 1-->Drift: limb holds 90 (or 45) degrees, but drifts down before full 10 seconds: does not hit bed or other support (residual deficit from previous stroke)  5b. Motor Arm, Right: 0-->No drift: limb holds 90 (or 45) degrees for full 10 secs  6a. Motor Leg, Left: 0-->No drift: leg holds 30 degree position for full 5 secs  6b. Motor Leg, Right: 0-->No drift: leg holds 30 degree position for full 5 secs  7. Limb Ataxia: 0-->Absent  8. Sensory: 0-->Normal: no sensory loss  9. Best Language: 0-->No aphasia: normal  10. Dysarthria: 0-->Normal  11. Extinction and Inattention (formerly Neglect): 0-->No abnormality  Total (NIH Stroke Scale): 4     Modified Yaima Score: 1  [place] Coma Scale:15   ABCD2 Score:    [id number] Score:   HAS -BLED Score:   ICH Score:   Hunt & Barriga Classification:      Thrombolysis Candidate? No, Out of window       Interventional Revascularization Candidate?   Is the patient eligible for mechanical endovascular reperfusion (TERRI)?  acute to subacute R MCA distribution infarction so no acute intervention    Hemorrhagic change of an Ischemic Stroke: Does this patient have an ischemic stroke with hemorrhagic changes? No     Subjective:     History of Present Illness:  Patient is a 73 y.o. male with significant past medical history of stroke 5 yrs ago with left  sided deficits, CKD, HIV, and HTN  presented to hospital complaining of multiple falls.  The patient and his family state the patient has been week x 1 week with multiple falls since Wednesday.  2 days ago he had slurred speech, it began Wednesday night.  The patient developed a facial droop ~2130 and subsequently presented to the ED for evaluation.  Of note, the patient lives alone with his dog, Kady.  His family lives across the street and frequently check on him.  He is very reluctant to be admitted to the hospital out of concern for his dog and wanting to eat.  Currently the patient is c/o a HA but has no slurred speech, dizziness, or CP.  He states he has poor vision at baseline and his vision is unchanged.  He also endorses feeling generally weak.  A CTH was obtained in the ED and was negative for acute findings.  An MRI brain was obtained and revealed an acute right MCA distribution infarction.  The patient will be admitted to Vascular Neurology for further evaluation.                     Past Medical History:   Diagnosis Date    Chronic kidney disease     HIV infection     HTN (hypertension) 10/24/2013    Stroke     2012     History reviewed. No pertinent surgical history.  Family History   Problem Relation Age of Onset    Hypertension Father     Hypertension Brother     Hypertension Paternal Grandmother      Social History   Substance Use Topics    Smoking status: Former Smoker     Packs/day: 0.25     Years: 20.00     Quit date: 9/18/1999    Smokeless tobacco: Never Used    Alcohol use No     Review of patient's allergies indicates:  No Known Allergies    Medications: I have reviewed the current medication administration record.    Current Outpatient Prescriptions:     darunavir (PREZISTA) 800 mg Tab, Take 1 tablet (800 mg total) by mouth daily with breakfast., Disp: 30 tablet, Rfl: 6    ferrous sulfate 325 (65 FE) MG EC tablet, Take 1 tablet (325 mg total) by mouth once daily., Disp: 30 tablet,  Rfl: 5    meclizine (ANTIVERT) 32 MG tablet, Take 12.5 mg by mouth 3 (three) times daily as needed., Disp: , Rfl:     ritonavir (NORVIR) 100 mg Cap, Take 1 capsule (100 mg total) by mouth once daily., Disp: 240 capsule, Rfl: 11    sulfamethoxazole-trimethoprim 800-160mg (BACTRIM DS) 800-160 mg Tab, Take 1 tablet by mouth 3 (three) times a week., Disp: 30 tablet, Rfl: 1    tenofovir (VIREAD) 300 mg Tab, Take 1 tablet (300 mg total) by mouth every 48 hours., Disp: 30 tablet, Rfl: 6    amlodipine (NORVASC) 10 MG tablet, Take 1 tablet (10 mg total) by mouth once daily., Disp: 30 tablet, Rfl: 2    gabapentin (NEURONTIN) 100 MG capsule, Take 1 capsule (100 mg total) by mouth 3 (three) times daily., Disp: 90 capsule, Rfl: 11    lamivudine (EPIVIR) 150 MG Tab, Take 1 tablet (150 mg total) by mouth once daily., Disp: 30 tablet, Rfl: 11    sodium bicarbonate 325 MG tablet, Take 1 tablet (325 mg total) by mouth 3 (three) times daily., Disp: 90 tablet, Rfl: 11      Review of Systems   Constitutional: Negative for chills and fever.   HENT: Negative for drooling and trouble swallowing.    Eyes: Negative for photophobia, discharge, redness and visual disturbance (poor vision at baseline but reports no change).   Respiratory: Negative for cough and shortness of breath.    Gastrointestinal: Negative for abdominal pain, nausea and vomiting.   Endocrine: Negative for cold intolerance and heat intolerance.   Genitourinary: Negative for hematuria.   Musculoskeletal: Positive for gait problem. Negative for neck pain and neck stiffness.   Skin: Negative for rash.   Allergic/Immunologic: Negative for environmental allergies and food allergies.   Neurological: Positive for dizziness (currently resolved), facial asymmetry, speech difficulty (currently resolved), weakness (generalized) and headaches. Negative for numbness.   Hematological: Negative for adenopathy. Does not bruise/bleed easily.   Psychiatric/Behavioral: Negative for  agitation and confusion.     Objective:     Vital Signs (Most Recent):  Temp: 99 °F (37.2 °C) (08/02/18 2104)  Pulse: 66 (08/03/18 0301)  Resp: 20 (08/02/18 2104)  BP: 125/84 (08/03/18 0301)  SpO2: (!) 93 % (08/03/18 0301)    Vital Signs Range (Last 24H):  Temp:  [99 °F (37.2 °C)]   Pulse:  [61-77]   Resp:  [20]   BP: (125-179)/(78-96)   SpO2:  [93 %-99 %]     Physical Exam   Constitutional: He is oriented to person, place, and time. He appears well-developed and well-nourished. No distress.   HENT:   Head: Normocephalic and atraumatic.   Right Ear: External ear normal.   Left Ear: External ear normal.   Nose: Nose normal.   Mouth/Throat: Oropharynx is clear and moist. No oropharyngeal exudate.   Eyes: Conjunctivae and EOM are normal. Pupils are equal, round, and reactive to light. Right eye exhibits no discharge. Left eye exhibits no discharge. No scleral icterus.   Neck: Normal range of motion. Neck supple. No thyromegaly present.   Cardiovascular: Normal rate and regular rhythm.    Pulmonary/Chest: Effort normal and breath sounds normal. No respiratory distress. He has no wheezes.   Abdominal: Soft. Bowel sounds are normal. He exhibits no distension. There is no tenderness.   Musculoskeletal: He exhibits no edema.   Lymphadenopathy:     He has no cervical adenopathy.   Neurological: He is alert and oriented to person, place, and time.   Skin: Skin is warm and dry. He is not diaphoretic.   Psychiatric: He has a normal mood and affect.   Nursing note and vitals reviewed.      Neurological Exam:   LOC: alert  Language: No aphasia  Articulation: No dysarthria  Visual Fields: Hemianopsia left  EOM (CN III, IV, VI): Full/intact  Facial Movement (CN VII): Upper & lower facial weakness on the Left  Sensation: Intact to light touch, temperature and vibration      Laboratory:  CMP:   Recent Labs  Lab 08/02/18  2213   CALCIUM 8.8   ALBUMIN 3.3*   PROT 7.1      K 3.9   CO2 22*      BUN 9   CREATININE 2.0*    ALKPHOS 68   ALT 12   AST 17   BILITOT 0.3     CBC:   Recent Labs  Lab 08/02/18  2213   WBC 5.14   RBC 5.20   HGB 15.2   HCT 45.2      MCV 87   MCH 29.2   MCHC 33.6     Lipid Panel: No results for input(s): CHOL, LDLCALC, HDL, TRIG in the last 168 hours.  Coagulation:   Recent Labs  Lab 08/02/18  2213   INR 1.0     Hgb A1C: No results for input(s): HGBA1C in the last 168 hours.  TSH: No results for input(s): TSH in the last 168 hours.    Diagnostic Results:      Brain imaging:  CTH 8/02/18 No acute intracranial abnormalities  Right parietal and occipital encephalomalacia suggesting remote infarcts. Some ex vacuo dilatation of the right lateral ventricle.  Atrophy.  Moderate confluent decreased supratentorial white matter attenuation most likely related to chronic nonspecific small vessel disease.    MRI Brain 8/03/18 Multiple acute to subacute infarcts in a right MCA distribution with partially occluding thrombus identified in the petrous segment of the right ICA.  Findings suggest embolic infarcts in the right MCA distribution.  Small focus of gyral enhancement in the right frontal lobe within the infarct, likely secondary to blood-barrier breakdown from recent infarct.  Advanced chronic microvascular ischemic change throughout the supratentorial white matter.     Vessel Imaging:  MRA Brain and Neck pending    Cardiac Evaluation:   2D Echo pending        Rachael Judd DNP, NP  Comprehensive Stroke Center  Department of Vascular Neurology   [facility]

## 2018-08-03 NOTE — PT/OT/SLP EVAL
Occupational Therapy   Evaluation    Name: Benjamin Zelaya  MRN: [id number]  Admitting Diagnosis:  Embolic stroke involving right middle cerebral artery      Recommendations:     Discharge Recommendations: outpatient OT  Discharge Equipment Recommendations:  none  Barriers to discharge:  None    History:     Occupational Profile:  Living Environment & PLOF: Pt resides alone in [place] in 1 story house with 3 steps 1 rail to enter. Pt has a bathtub for bathing.  Pt reports that he was independent with all ADL's, cooking, & housework.  Pt reported that he drives however grandson reported that he has only driven ~ 3 times since prior stroke 5 years ago.  Pt is retired from teaching special education.  Pt enjoys going to [place] to see his 2 sisters.  Pt's grandson reported that he would be able to have pt come stay with him for 5 days & then he could bring pt to his work place with him after that to supervise him while he works.  Equipment Owned:  none  Assistance upon Discharge: yes    Past Medical History:   Diagnosis Date    Chronic kidney disease     HIV infection     HTN (hypertension) 10/24/2013    Stroke     2012       History reviewed. No pertinent surgical history.    Subjective   [facility]  Chief Complaint: weakness in LUE  Patient/Family stated goals: to get mobility back  Communicated with: RN prior to session.  Pain/Comfort:  · Pain Rating 1: 0/10  · Pain Rating Post-Intervention 1: 0/10    Patients cultural, spiritual, Islam conflicts given the current situation: Alevism    Objective:     Patient found with: telemetry    General Precautions: Standard, aspiration, fall, NPO (cardiac)     Occupational Performance:    Bed Mobility:    · Patient completed Supine to Sit with contact guard assistance    Functional Mobility/Transfers:  · Patient completed Sit <> Stand Transfer with stand by assistance  with  no assistive device    · Patient completed Toilet Transfer Stand Pivot technique with stand by assistance with  no AD  · Functional Mobility: Pt performed functional mobility to bathroom & back with SBA-CGA    Activities of Daily Living:  · Grooming: stand by assistance standing at sink  · Upper Body Dressing: maximal assistance donning gown seated EOB (pt reported that he usually wears pull over shirts  · Lower Body Dressing: stand by assistance donning socks seated EOB    Cognitive/Visual Perceptual:  Cognitive/Psychosocial Skills:     -       Oriented to: Person, Place, Time and Situation   -       Follows Commands/attention:Follows multistep  commands  -       Communication: slurred  -       Memory: No Deficits noted  -       Safety awareness/insight to disability: impaired   -       Mood/Affect/Coping skills/emotional control: Appropriate to situation    Physical Exam:  Postural examination/scapula alignment:    -       Rounded shoulders  -       Forward head  -       Posterior pelvic tilt  Sensation:    -       Intact  Dominant hand:    -       right  Upper Extremity Range of Motion:     -       Right Upper Extremity: WFL  -       Left Upper Extremity: 90* shoulder flexion, WFL all others except decreased finger flexion & extension  Upper Extremity Strength:    -       Right Upper Extremity: WFL  -       Left Upper Extremity: WFL except 3-/5 shoulder flexion, 2-/5 finger flexion/extension (fingers stay flexed unless assisted to extension)   Strength:    -       Right Upper Extremity: WFL  -       Left Upper Extremity: very minimal (1/5)  Fine Motor Coordination:    -       Impaired  Left hand thumb/finger opposition skills   and Left hand, manipulation of objects      Patient left seated EOB with all lines intact, call button in reach, RN notified, family member present and white board updated.    AMPA 6 Click:  AMPA Total Score: 17    Treatment & Education:  Provided education regarding role of OT, POC, & discharge  "recommendations with pt verbalizing understanding.  Pt had no further questions & when asked whether there were any concerns pt reported none.      Education:    Assessment:     Benjamin Zelaya is a 73 y.o. male with a medical diagnosis of Embolic stroke involving right middle cerebral artery.  He presents with good participation and motivation.  Pt will require supervision initially upon discharge home due to fall risk.  Performance deficits affecting function are weakness, impaired self care skills, impaired balance, impaired functional mobilty, decreased coordination, decreased upper extremity function.      Rehab Prognosis:  good; patient would benefit from acute skilled OT services to address these deficits and reach maximum level of function.         Clinical Decision Makin.  OT Low:  "Pt evaluation falls under low complexity for evaluation coding due to performance deficits noted in 1-3 areas as stated above and 0 co-morbities affecting current functional status. Data obtained from problem focused assessments. No modifications or assistance was required for completion of evaluation. Only brief occupational profile and history review completed."     Plan:     Patient to be seen 3 x/week to address the above listed problems via self-care/home management, cognitive retraining, sensory integration, therapeutic activities, therapeutic exercises, neuromuscular re-education  · Plan of Care Expires: 18  · Plan of Care Reviewed with: patient, grandchild(jj)    This Plan of care has been discussed with the patient who was involved in its development and understands and is in agreement with the identified goals and treatment plan    GOALS:    Occupational Therapy Goals        Problem: Occupational Therapy Goal    Goal Priority Disciplines Outcome Interventions   Occupational Therapy Goal     OT, PT/OT Ongoing (interventions implemented as appropriate)    Description:  Goals to be met by: 8/10/18     Patient " will increase functional independence with ADLs by performing:    UE Dressing with Supervision.  LE Dressing with Supervision.  Grooming while standing with Supervision.  Toileting from toilet with Supervision for hygiene and clothing management.   Supine to sit with Modified [employer].  Stand pivot transfers with Modified [employer].                      Time Tracking:     OT Date of Treatment: 08/03/18  OT Start Time: 1314  OT Stop Time: 1335  OT Total Time (min): 21 min    Billable Minutes:Evaluation 20    MELODY Gerber  8/3/2018

## 2018-08-03 NOTE — PLAN OF CARE
Problem: SLP Goal  Goal: SLP Goal  Speech Language Pathology Goals  Goals expected to be met by 8/10/18  1. Pt will tolerate dental soft diet with thin liquids w/o overt S/S aspiration, MOD I  2. Pt will tolerate trials of advanced textures with adequate oral clearance and no overt S/S aspiration, MOD I  3. Pt will complete OMEs x10 ea with good effort 90% of attempts, MOD I, to improve labial/mandibular strength and coordination  4. Pt will complete sentence repetition tasks with 90% intelligibility, MOD I, to improve speech   5. Pt will complete fx math tasks with 90% accuracy, Supervision  6. Pt will complete basic visiospatial tasks with 90% accuracy, Supervision  7. Pt will complete further assessment of reading and writing skills   8. Educate Pt and family on safety precautions and S/S aspiration       Outcome: Ongoing (interventions implemented as appropriate)  SLP evaluation completed. Patient presents with Mild Oral Dysphagia, Dysarthria, and Mild higher-level Cognitive Impairment as characterized by Mildly decreased sustained attention, math and visiospatial skills. See report for full details. REC; Dental soft diet with thin liquids, whole medications one at a time ok, provided strict aspiration precautions. Please monitor for pocketing and S/S aspiration and discontinue oral feeding should you notice any of the following: watery eyes, reddened facial area, wet vocal quality, increased work of breathing, change in respiratory status, increased congestion, coughing, fever, and.or confusion. Patient would benefit from ongoing OP ST upon d/c from acute. Findings reviewed with Patient and Grandson. Thank you.    FLAKITO Castillo., [facility]-SLP  Speech-Language Pathology  Pager: [phone]  8/3/2018

## 2018-08-03 NOTE — PT/OT/SLP EVAL
"Physical Therapy Evaluation    Patient Name:  Benjamin Zelaya   MRN:  [id number]    Recommendations:     Discharge Recommendations:  outpatient PT, outpatient OT, recommend no driving   Discharge Equipment Recommendations: none   Barriers to discharge: None    Plan:     During this hospitalization, patient to be seen 3 x/week to address the above listed problems via gait training, therapeutic activities, therapeutic exercises, neuromuscular re-education  · Plan of Care Expires:  09/03/18   Plan of Care Reviewed with: patient    History:     Past Medical History:   Diagnosis Date    Chronic kidney disease     HIV infection     HTN (hypertension) 10/24/2013    Stroke     2012       History reviewed. No pertinent surgical history.    Subjective     Chief Complaint: "did not sleep last night d/t tests and interruptions"  Patient comments/goals: return home  Pain/Comfort:  Pain Rating 1: 0/10  Pain Rating Post-Intervention 1: 0/10    Living Environment:  Pt lives alone in a 1 story home with 3-4 steps and 1 rail to enter.  He was independent with mobility without DME.  Owns a dog, but he did not walk dog on leash PTA.  He would let the dog out in the back yard or attach leash to fence.   He has residual L sided deficits from recent stroke and was undergoing outpatient therapy.  He has family across the street.      Objective:     Patient found with: telemetry     General Precautions: Standard, aspiration, fall   The patient was found supine in bed asleep.  He was initially very somnolent and required 2-3 attempts to rouse him, but he was then able to remain awake for the remainder of the session.     PHYSICAL EXAMINATION  Cognitive Function:  - Oriented to: person, place, situation   - Level of Alertness: awake, attentive  - Follows Commands/attention: Follows two-step commands  - Safety awareness/insight to disability: intact  Musculoskeletal System  Facial droop: L facial droop  Upper Extremities:   ROM: WFL  Strength: " LUE hemiparesis (appears chronic as patient performed one handed dressing technique to doff/don socks)  Lower Extremities:  ROM: WFL  Strength:  Muscle Group R LE L LE Comments   Hip ext 5/5 5/5    Hip flexion  5/5 4/5       Knee flexion  5/5 5/5       Knee ext. 5/5 5/5    Ankle DF 5/5 5/5    Ankle PF 5/5 5/5    Cardiopulmonary System:   Recent vitals:   Vitals:    08/03/18 1130   BP: (!) 169/78   Pulse: (!) 49   Resp: 18   Temp: 96.2 °F (35.7 °C)   Neuromuscular System:  - Sensation: NT  - Coordination: NT  Posture and gross symmetry: holds LUE in flexed position   Vision:  Inattention to L visual field    BALANCE:  Sitting: independent with static and dynamic balance   Standing: SBA with no AD    FUNCTIONAL MOBILITY ASSESSMENT:  Bed Mobility: performed with HOB flat  - Rolling/Turning R: NT  - Rolling/Turning L: modified independent with bed rail   - Supine > sit: modified independent with bed rail   - Sit > supine: independent  - Scooting EOB: supervision      Transfers:  - Sit <> stand transfer: stand by assistance   - Bed <> chair transfer: stand by assistance    Gait:   Gait x 100 feet with supervision and verbal cues for object identification on the L.  Gait was performed in a busy hallway with multiple obstacles (stationary and moving) on both sides of the hallway.  He successfully navigated these obstacles without coming into contact with objects on the L, despite getting very close to them.    - Patient demonstrated altered gait kinematics with L hemiplegic gait pattern, characterized by increased time to complete L swing phase, decreased L toe off in terminal stance, decreased L clearance during swing without foot drop, decreased L heel touch at initial contact, and increased L knee flexion in stance without buckling.   - See additional gait assessment under functional outcomes.    Stairs:  Pt ascended/descended 5 step(s) with R handrail(s) and supervision    The patient ascended 5 steps using R rail with  "step over step pattern, L toes hit step d/t misjudging distance.    The patient descended 5 steps with hand on R wall (home set up) with step over step gait pattern.       FUNCTIONAL OUTCOME MEASURES:  Additional gait/balance assessment was performed to determine fall risk:    TINETTI BALANCE ASSESSMENT TOOL  Assistive Device  Normally Used: No  Assistive Device During Testing: No   Balance (Patient is seated in hard, armless chair)  1.Sitting Balance: Steady; safe = 1  2.Rises from chair: Able, uses arms to help = 1  3.Attempts to arise: Able to arise, 1 attempt = 2  4.Immediate standing Balance (first 5 seconds): Steady without walker or other support = 2  5.Standing balance: Steady but wide stance (medal heel>4" apart) & uses cane or other support = 1  6.Nudged: Staggers, grabs, catches self = 1  7.Eyes closed: Steady = 1  8.Turning 360 degrees: Discontinuous steps = 0 and Steady = 1  9.Sitting Down: Safe, smooth motion = 2   Balance Score:  12/16  Gait (Patient stands with therapist, walks across room (+/- aids), first at usual pace, then at rapid pace.)  10.Initiation of Gait (Immediately after told to go.): No hesitancy = 1  11.Step length and height:  Step through R=1 and Step through L=1  12.Foot Clearance: R foot clears floor=1   13.Step symmetry: Right & Left step length not equal (estimate) = 0  14.Step continuity: Steps appear continuous = 1  15.Path: Mild/moderate deviation or uses walking aid = 1  16.Trunk: No sway, no flexion, no use of arms, and no use of walking aid = 2  17.Walking Time: Heels amost touching while walking = 1   Gait Score: 9/12  Total Score (Balance + Gait): 21/28   ?18 = High risk of falls   19-23 = Moderate risk of falls   ?24 = Low risk of falls    Dynamic Gait Index (DGI):  1. Gait level surface: (2) Mild Impairment: Walks 20ft, uses assistive devices, slower speed, mild gait deviations.  2. Change in gait speed: (1) Moderate Impairment: Makes only minor adjustments to walking " speed, or accomplishes a changein speed with significant gait deviations, or changes speed but has significant gait deviations, orchanges speed but loses balance but is able to recover and continue walking..  3. Gait with horizontal head turns: (3) Normal: Performs head turns smoothly with no change in gait..  4. Gait with vertical head turns: (3) Normal: Performs head turns smoothly with no change in gait..  5. Gait and pivot turn: (2) Mild Impairment: Pivot turns safely in > 3 seconds and stops with no loss of balance..  6. Step over obstacle: (1) Moderate Impairment: Is able to step over box but must stop, then step over. May require verbalCueing..  7. Step around obstacles: (2) Mild Impairment: Is able to step around both cones, but must slow down and adjust steps to clearCones..  8. Steps: (2) Mild Impairment: Alternating feet, must use rail..    TOTAL SCORE: 16 / 24   < 19/24 = predictive of falls in the elderly   > 22/24 = safe ambulators    AM-PAC 6 CLICK MOBILITY  Total Score:22     Therapeutic Activities, Education, or Exercises:  Therapist educated patient on the role of PT, POC, and therapy recommendations of OP therapy.  Therapist discussed the patients current mobility status, deficits, and level of assistance with patient.   Time was provided for active listening, discussion of health disposition, and discussion of safe discharge recommendations. Therapist answered questions to patient/familys satisfaction within scope of practice.  Patient and family are aware of patient's deficits and therapy progression. White board updated to reflect current level of assistance.      Patient left supine with all lines intact, call button in reach and family  present.    GOALS:    Physical Therapy Goals        Problem: Physical Therapy Goal    Goal Priority Disciplines Outcome Goal Variances Interventions   Physical Therapy Goal     PT/OT, PT Ongoing (interventions implemented as appropriate)     Description:     Goals to be met by 8/17/2018    1. Pt will perform sit to stand transfers with independence.    2. Pt will perform bed <> chair transfers with independence.  3. Pt will perform gait x 150 feet with independence using L head turns to compensate for visual deficit.  4. Pt will ascend and descend 5 steps with modified independence using 1 rail to safely access home environment.   5. Pt will improve balance to >24/28 on Tinetti Balance Scale to decrease risk for falls in the home environment.   6. Pt will improve dynamic gait to >19/24 to enable safe ambulation in community environments.                     Assessment:     Benjamin Zelaya is a 73 y.o. male admitted with a medical diagnosis of Embolic stroke involving right middle cerebral artery. He was independent at home PTA but had residual L sided deficits from a previous stroke.  He now presents with the following impairments/functional limitations:  weakness, impaired functional mobilty, gait instability, impaired fine motor, impaired balance, decreased upper extremity function, decreased lower extremity function, visual deficits, impaired self care skills.  He had noticeable deficits in his LUE during ADLs and LLE during gait.  He is able to safely ambulate for household distances without an assistive device.  He safely performed stair climbing with 1 rail.  Recommend outpatient physical therapy to address gait and balance deficits that pose fall risk with community ambulation.       Rehab Prognosis:  good; patient would benefit from acute skilled PT services to address these deficits and reach maximum level of function.      Recent Surgery: * No surgery found *      Clinical Decision Making:   COMPLEXITY OF PT EXAMINATION:  HISTORY  - Comorbidities that affect the PT plan of care or the patient's ability to participate in/progress with therapy:  1. Prior stroke  2. HIV  - Personal Factors:   1. Time since onset of injury / illness / exacerbation.  2. Patient's  home situation (environment and family support).  EXAMINATION  - Body Systems:  1. Communication ability, affect, cognition, language, and learning style: the assessment of the ability to make needs known, consciousness, orientation, expected emotional /behavioral responses, and learning preferences  2. Neuromuscular system: a general assessment of gross coordinated movement (eg, balance, gait, locomotion, transfers, and transitions) and motor function (motor control and motor learning)  3. Musculoskeletal system: the assessment of gross symmetry, gross range of motion, gross strength, height, and weight  4. Cardiovascular/pulmonary system: the assessment of heart rate, respiratory rate, blood pressure, SpO2, and edema   - Activity or participation limitations:   Status of current condition  CLINICAL PRESENTATION: Stable and/or uncomplicated  LEVEL OF COMPLEXITY: Low Complexity - no personal factors or comorbidities that impact the plan of care; examination addressing 1-2 body structures and functions, activity limitations, and/or participation restrictions; and clinical presentation with stable or uncomplicated characteristics.      Time Tracking:     PT Received On: 08/03/18  PT Start Time: 0915     PT Stop Time: 0937  PT Total Time (min): 22 min     Billable Minutes: Evaluation 12 and Neuromuscular Re-education 10      Neetu Chatterjee, PT  08/03/2018

## 2018-08-03 NOTE — SUBJECTIVE & OBJECTIVE
Past Medical History:   Diagnosis Date    Chronic kidney disease     HIV infection     HTN (hypertension) 10/24/2013    Stroke     2012     History reviewed. No pertinent surgical history.  Family History   Problem Relation Age of Onset    Hypertension Father     Hypertension Brother     Hypertension Paternal Grandmother      Social History   Substance Use Topics    Smoking status: Former Smoker     Packs/day: 0.25     Years: 20.00     Quit date: 9/18/1999    Smokeless tobacco: Never Used    Alcohol use No     Review of patient's allergies indicates:  No Known Allergies    Medications: I have reviewed the current medication administration record.    Current Outpatient Prescriptions:     darunavir (PREZISTA) 800 mg Tab, Take 1 tablet (800 mg total) by mouth daily with breakfast., Disp: 30 tablet, Rfl: 6    ferrous sulfate 325 (65 FE) MG EC tablet, Take 1 tablet (325 mg total) by mouth once daily., Disp: 30 tablet, Rfl: 5    meclizine (ANTIVERT) 32 MG tablet, Take 12.5 mg by mouth 3 (three) times daily as needed., Disp: , Rfl:     ritonavir (NORVIR) 100 mg Cap, Take 1 capsule (100 mg total) by mouth once daily., Disp: 240 capsule, Rfl: 11    sulfamethoxazole-trimethoprim 800-160mg (BACTRIM DS) 800-160 mg Tab, Take 1 tablet by mouth 3 (three) times a week., Disp: 30 tablet, Rfl: 1    tenofovir (VIREAD) 300 mg Tab, Take 1 tablet (300 mg total) by mouth every 48 hours., Disp: 30 tablet, Rfl: 6    amlodipine (NORVASC) 10 MG tablet, Take 1 tablet (10 mg total) by mouth once daily., Disp: 30 tablet, Rfl: 2    gabapentin (NEURONTIN) 100 MG capsule, Take 1 capsule (100 mg total) by mouth 3 (three) times daily., Disp: 90 capsule, Rfl: 11    lamivudine (EPIVIR) 150 MG Tab, Take 1 tablet (150 mg total) by mouth once daily., Disp: 30 tablet, Rfl: 11    sodium bicarbonate 325 MG tablet, Take 1 tablet (325 mg total) by mouth 3 (three) times daily., Disp: 90 tablet, Rfl: 11      Review of Systems    Constitutional: Negative for chills and fever.   HENT: Negative for drooling and trouble swallowing.    Eyes: Negative for photophobia, discharge, redness and visual disturbance (poor vision at baseline but reports no change).   Respiratory: Negative for cough and shortness of breath.    Gastrointestinal: Negative for abdominal pain, nausea and vomiting.   Endocrine: Negative for cold intolerance and heat intolerance.   Genitourinary: Negative for hematuria.   Musculoskeletal: Positive for gait problem. Negative for neck pain and neck stiffness.   Skin: Negative for rash.   Allergic/Immunologic: Negative for environmental allergies and food allergies.   Neurological: Positive for dizziness (currently resolved), facial asymmetry, speech difficulty (currently resolved), weakness (generalized) and headaches. Negative for numbness.   Hematological: Negative for adenopathy. Does not bruise/bleed easily.   Psychiatric/Behavioral: Negative for agitation and confusion.     Objective:     Vital Signs (Most Recent):  Temp: 99 °F (37.2 °C) (08/02/18 2104)  Pulse: 66 (08/03/18 0301)  Resp: 20 (08/02/18 2104)  BP: 125/84 (08/03/18 0301)  SpO2: (!) 93 % (08/03/18 0301)    Vital Signs Range (Last 24H):  Temp:  [99 °F (37.2 °C)]   Pulse:  [61-77]   Resp:  [20]   BP: (125-179)/(78-96)   SpO2:  [93 %-99 %]     Physical Exam   Constitutional: He is oriented to person, place, and time. He appears well-developed and well-nourished. No distress.   HENT:   Head: Normocephalic and atraumatic.   Right Ear: External ear normal.   Left Ear: External ear normal.   Nose: Nose normal.   Mouth/Throat: Oropharynx is clear and moist. No oropharyngeal exudate.   Eyes: Conjunctivae and EOM are normal. Pupils are equal, round, and reactive to light. Right eye exhibits no discharge. Left eye exhibits no discharge. No scleral icterus.   Neck: Normal range of motion. Neck supple. No thyromegaly present.   Cardiovascular: Normal rate and regular rhythm.     Pulmonary/Chest: Effort normal and breath sounds normal. No respiratory distress. He has no wheezes.   Abdominal: Soft. Bowel sounds are normal. He exhibits no distension. There is no tenderness.   Musculoskeletal: He exhibits no edema.   Lymphadenopathy:     He has no cervical adenopathy.   Neurological: He is alert and oriented to person, place, and time.   Skin: Skin is warm and dry. He is not diaphoretic.   Psychiatric: He has a normal mood and affect.   Nursing note and vitals reviewed.      Neurological Exam:   LOC: alert  Language: No aphasia  Articulation: No dysarthria  Visual Fields: Hemianopsia left  EOM (CN III, IV, VI): Full/intact  Facial Movement (CN VII): Upper & lower facial weakness on the Left  Sensation: Intact to light touch, temperature and vibration      Laboratory:  CMP:   Recent Labs  Lab 08/02/18  2213   CALCIUM 8.8   ALBUMIN 3.3*   PROT 7.1      K 3.9   CO2 22*      BUN 9   CREATININE 2.0*   ALKPHOS 68   ALT 12   AST 17   BILITOT 0.3     CBC:   Recent Labs  Lab 08/02/18  2213   WBC 5.14   RBC 5.20   HGB 15.2   HCT 45.2      MCV 87   MCH 29.2   MCHC 33.6     Lipid Panel: No results for input(s): CHOL, LDLCALC, HDL, TRIG in the last 168 hours.  Coagulation:   Recent Labs  Lab 08/02/18  2213   INR 1.0     Hgb A1C: No results for input(s): HGBA1C in the last 168 hours.  TSH: No results for input(s): TSH in the last 168 hours.    Diagnostic Results:      Brain imaging:  CTH 8/02/18 No acute intracranial abnormalities  Right parietal and occipital encephalomalacia suggesting remote infarcts. Some ex vacuo dilatation of the right lateral ventricle.  Atrophy.  Moderate confluent decreased supratentorial white matter attenuation most likely related to chronic nonspecific small vessel disease.    MRI Brain 8/03/18 Multiple acute to subacute infarcts in a right MCA distribution with partially occluding thrombus identified in the petrous segment of the right ICA.  Findings  suggest embolic infarcts in the right MCA distribution.  Small focus of gyral enhancement in the right frontal lobe within the infarct, likely secondary to blood-barrier breakdown from recent infarct.  Advanced chronic microvascular ischemic change throughout the supratentorial white matter.     Vessel Imaging:  MRA Brain and Neck pending    Cardiac Evaluation:   2D Echo pending

## 2018-08-03 NOTE — ED NOTES
Pt reports that he fell Wednesday after he became dizzy. Pt says that after falling he felt lightheaded.  Pt denies hitting his head. Upon arrival pt denies dizziness lightheadedness. Pt had a stroke 5 yrs ago. Pt says that he had slurred speech 8/1 and a slight facial droop, 8/2 around 2130. Pt admits that he has been falling lately.

## 2018-08-03 NOTE — PROGRESS NOTES
Notified Cara with Stroke service of patient's persistent asymptomatic Bradycardia (mid 40's). Pt currently asleep, VSS. NAD. No new orders noted from team.

## 2018-08-03 NOTE — HPI
Benjamin Zelaya is a 73-year-old male with PMHx of HTN, CKD, CVA with residual left-sided deficits, and HIV.  Patient presented to [facility] on 8/2/18 for general weakness, dizziness, and gait instability x 1 week with subsequent falls, slurred speech x 2 days, and new onset left-sided facial droop.  On arrival, evaluated by Vascular Neurology.  CTH without acute pathology.  Not tPA candidate 2/2 outside of treatment window.  MRI brain revealed acute/subacute R MCA infarct, suggestive of embolic etiology.  MRA brain and neck showed multifocal occlusion of R carotid circulation involving R proximal ICA at the carotid bifurcation and R distal ICA and absence of flow within L vertebral artery concerning for new thrombosis.      Functional History: Patient lives in [place], alone (family lives across the street), in a single story home with 3 steps to enter.  Prior to admission, he was (I) with ADLs, including driving, and mobility.  IRF for several months after previous stroke.  He is right handed.  DME: none.

## 2018-08-03 NOTE — PROVIDER PROGRESS NOTES - EMERGENCY DEPT.
Encounter Date: 8/2/2018  SCRIBE NOTE: Floresita DIAS am scribing for, and in the presence of, Dr Orozco.    ED Physician Progress Notes         EKG - STEMI Decision  Initial Reading: No STEMI present.

## 2018-08-03 NOTE — CONSULTS
[facility]  Adult Nutrition  Education Note    Diet Education: Cardiac, Stroke pathway  Time Spent: 15 min  Learners: Pt      Nutrition Education provided with handouts: Stroke Nutrition Therapy      Comments: Consult received for cardiac diet education. Provided and explained handout detailing heart healthy fats and increasing omega 3 fatty acids and fiber intake. Encouraged pt to monitor sodium intake. Pt voiced understanding.      Follow-Up: 1x weekly    Please re-consult as needed.

## 2018-08-03 NOTE — PLAN OF CARE
Problem: Physical Therapy Goal  Goal: Physical Therapy Goal  Outcome: Ongoing (interventions implemented as appropriate)  Initial eval completed.  Results, POC, and therapy recommendations discussed with patient.   Complete evaluation documentation to follow.    Mobility Recommendations: safe to ambulate with nursing without an assistive device  D/c recommendations: outpatient PT and OT     Neetu Chatterjee, PT  8/3/2018  [phone] (pager)

## 2018-08-04 LAB
ALBUMIN SERPL BCP-MCNC: 3.2 G/DL
ALP SERPL-CCNC: 63 U/L
ALT SERPL W/O P-5'-P-CCNC: 11 U/L
ANION GAP SERPL CALC-SCNC: 9 MMOL/L
AST SERPL-CCNC: 16 U/L
BASOPHILS # BLD AUTO: 0.04 K/UL
BASOPHILS NFR BLD: 0.8 %
BILIRUB SERPL-MCNC: 0.7 MG/DL
BUN SERPL-MCNC: 12 MG/DL
CALCIUM SERPL-MCNC: 8.8 MG/DL
CHLORIDE SERPL-SCNC: 107 MMOL/L
CO2 SERPL-SCNC: 20 MMOL/L
CREAT SERPL-MCNC: 1.8 MG/DL
DIFFERENTIAL METHOD: ABNORMAL
EOSINOPHIL # BLD AUTO: 0.2 K/UL
EOSINOPHIL NFR BLD: 3 %
ERYTHROCYTE [DISTWIDTH] IN BLOOD BY AUTOMATED COUNT: 14.1 %
EST. GFR  (AFRICAN AMERICAN): 42.8 ML/MIN/1.73 M^2
EST. GFR  (NON AFRICAN AMERICAN): 37 ML/MIN/1.73 M^2
GLUCOSE SERPL-MCNC: 96 MG/DL
HCT VFR BLD AUTO: 45.5 %
HGB BLD-MCNC: 15.2 G/DL
IMM GRANULOCYTES # BLD AUTO: 0 K/UL
IMM GRANULOCYTES NFR BLD AUTO: 0 %
LYMPHOCYTES # BLD AUTO: 2.2 K/UL
LYMPHOCYTES NFR BLD: 43.1 %
MCH RBC QN AUTO: 28.5 PG
MCHC RBC AUTO-ENTMCNC: 33.4 G/DL
MCV RBC AUTO: 85 FL
MONOCYTES # BLD AUTO: 0.5 K/UL
MONOCYTES NFR BLD: 9.4 %
NEUTROPHILS # BLD AUTO: 2.2 K/UL
NEUTROPHILS NFR BLD: 43.7 %
NRBC BLD-RTO: 0 /100 WBC
PLATELET # BLD AUTO: 243 K/UL
PMV BLD AUTO: 8.4 FL
POTASSIUM SERPL-SCNC: 3.8 MMOL/L
PROT SERPL-MCNC: 6.7 G/DL
RBC # BLD AUTO: 5.34 M/UL
SODIUM SERPL-SCNC: 136 MMOL/L
WBC # BLD AUTO: 5.08 K/UL

## 2018-08-04 PROCEDURE — 85025 COMPLETE CBC W/AUTO DIFF WBC: CPT

## 2018-08-04 PROCEDURE — 94761 N-INVAS EAR/PLS OXIMETRY MLT: CPT

## 2018-08-04 PROCEDURE — 20600001 HC STEP DOWN PRIVATE ROOM

## 2018-08-04 PROCEDURE — A4216 STERILE WATER/SALINE, 10 ML: HCPCS | Performed by: NURSE PRACTITIONER

## 2018-08-04 PROCEDURE — 63600175 PHARM REV CODE 636 W HCPCS: Performed by: NURSE PRACTITIONER

## 2018-08-04 PROCEDURE — 25000003 PHARM REV CODE 250: Performed by: NURSE PRACTITIONER

## 2018-08-04 PROCEDURE — 99233 SBSQ HOSP IP/OBS HIGH 50: CPT | Mod: ,,, | Performed by: PSYCHIATRY & NEUROLOGY

## 2018-08-04 PROCEDURE — 36415 COLL VENOUS BLD VENIPUNCTURE: CPT

## 2018-08-04 PROCEDURE — 80053 COMPREHEN METABOLIC PANEL: CPT

## 2018-08-04 RX ADMIN — ACETAMINOPHEN 650 MG: 325 TABLET, FILM COATED ORAL at 06:08

## 2018-08-04 RX ADMIN — HEPARIN SODIUM 5000 UNITS: 5000 INJECTION, SOLUTION INTRAVENOUS; SUBCUTANEOUS at 01:08

## 2018-08-04 RX ADMIN — FERROUS SULFATE TAB EC 325 MG (65 MG FE EQUIVALENT) 325 MG: 325 (65 FE) TABLET DELAYED RESPONSE at 10:08

## 2018-08-04 RX ADMIN — Medication 3 ML: at 01:08

## 2018-08-04 RX ADMIN — HEPARIN SODIUM 5000 UNITS: 5000 INJECTION, SOLUTION INTRAVENOUS; SUBCUTANEOUS at 09:08

## 2018-08-04 RX ADMIN — HEPARIN SODIUM 5000 UNITS: 5000 INJECTION, SOLUTION INTRAVENOUS; SUBCUTANEOUS at 06:08

## 2018-08-04 RX ADMIN — Medication 3 ML: at 06:08

## 2018-08-04 RX ADMIN — DARUNAVIR 800 MG: 800 TABLET, FILM COATED ORAL at 10:08

## 2018-08-04 RX ADMIN — ATORVASTATIN CALCIUM 40 MG: 20 TABLET, FILM COATED ORAL at 10:08

## 2018-08-04 RX ADMIN — ASPIRIN 81 MG: 81 TABLET, COATED ORAL at 10:08

## 2018-08-04 RX ADMIN — Medication 3 ML: at 09:08

## 2018-08-04 NOTE — PROGRESS NOTES
[facility]  Vascular Neurology  Comprehensive Stroke Center  Progress Note    Assessment/Plan:     * Embolic stroke involving right middle cerebral artery    73 y.o. male with significant past medical history of stroke 5 yrs ago with left sided deficits, CKD, HIV, and HTN  presented to hospital complaining of multiple falls.  MRI revealed acute R MCA distribution infarction.  Patient did not receive tPA.  No intervention.  Symptoms began or worsened on Wednesday night.  He did not present to the ED until Thursday night.    Antithrombotics for secondary stroke prevention: Antiplatelets: Aspirin: 81 mg daily    Statins for secondary stroke prevention and hyperlipidemia, if present:   Statins: Atorvastatin- 40 mg daily    Aggressive risk factor modification: HTN, HIV     Rehab efforts: PT/OT/SLP to evaluate and treat    Diagnostics ordered/pending: None     VTE prophylaxis: Heparin 5000 units SQ every 8 hours    BP parameters: Infarct: No intervention, SBP <220            Cytotoxic cerebral edema    Area of cytotoxic cerebral edema identified when reviewing brain imaging in the territory of the right middle cerebral artery. There no mass effect associated with it. We will continue to monitor the patients clinical exam for any worsening of symptoms which may indicate expansion of the stroke or the area of the edema resulting in the clinical change. The pattern is suggestive of embolic etiology.              CKD (chronic kidney disease), stage III    -Hx CKD.  Baseline creatinine 1.8-2.1.  Avoid nephrotoxins if possible.        HTN (hypertension)    -Stroke risk factor.  SBP<220 for now.  -Resume home meds when appropriate        HIV infection    -Stroke risk factor.  Continue home meds.             72 y/o male with Multiple acute to subacute infarcts in a right MCA distribution with partially occluding thrombus identified in the petrous segment of the right ICA.   8-3-18 Multifocal occlusion of the  right carotid circulation involving the proximal ICA at the carotid bifurcation as well as the petrous/cavernous segment of the distal right ICA.=  Absence of flow within the left vertebral artery which appears new from concomitantly dated MRI brain and concerning for new thrombosis.    STROKE DOCUMENTATION        NIH Scale:  1a. Level Of Consciousness: 0-->Alert: keenly responsive  1b. LOC Questions: 0-->Answers both questions correctly  1c. LOC Commands: 0-->Performs both tasks correctly  2. Best Gaze: 0-->Normal  3. Visual: 1-->Partial hemianopia  4. Facial Palsy: 2-->Partial paralysis (total or near-total paralysis of lower face)  5a. Motor Arm, Left: 1-->Drift: limb holds 90 (or 45) degrees, but drifts down before full 10 seconds: does not hit bed or other support  5b. Motor Arm, Right: 0-->No drift: limb holds 90 (or 45) degrees for full 10 secs  6a. Motor Leg, Left: 0-->No drift: leg holds 30 degree position for full 5 secs  6b. Motor Leg, Right: 0-->No drift: leg holds 30 degree position for full 5 secs  7. Limb Ataxia: 0-->Absent  8. Sensory: 0-->Normal: no sensory loss  9. Best Language: 0-->No aphasia: normal  10. Dysarthria: 0-->Normal  11. Extinction and Inattention (formerly Neglect): 0-->No abnormality  Total (NIH Stroke Scale): 4       Modified Columbus Score: 1  Nicky Coma Scale:    ABCD2 Score:    [id number] Score:   HAS -BLED Score:   ICH Score:   Hunt & Barriga Classification:      Hemorrhagic change of an Ischemic Stroke: Does this patient have an ischemic stroke with hemorrhagic changes? No     Neurologic Chief Complaint: left facial droop, multiple falls    Subjective:     Interval History: Patient is seen for follow-up neurological assessment and treatment recommendations: NO issues overnight, was bradycardic yesterday    HPI, Past Medical, Family, and Social History remains the same as documented in the initial encounter.     Review of Systems   Constitutional: Negative for chills and fever.    Respiratory: Negative for cough and shortness of breath.    Neurological: Positive for weakness.     Scheduled Meds:   aspirin  81 mg Oral Daily    atorvastatin  40 mg Oral Daily    darunavir ethanolate  800 mg Oral Daily with breakfast    ferrous sulfate  325 mg Oral Daily    heparin (porcine)  5,000 Units Subcutaneous Q8H    potassium, sodium phosphates  1 packet Oral Once    sodium chloride 0.9%  3 mL Intravenous Q8H    sulfamethoxazole-trimethoprim 800-160mg  1 tablet Oral Once per day on Mon Wed Fri    tenofovir  300 mg Oral Q48H     Continuous Infusions:   sodium chloride 0.9%       PRN Meds:acetaminophen, labetalol, sodium chloride 0.9%    Objective:     Vital Signs (Most Recent):  Temp: 97.5 °F (36.4 °C) (08/04/18 1618)  Pulse: 64 (08/04/18 1618)  Resp: 18 (08/04/18 1618)  BP: (!) 153/70 (08/04/18 1618)  SpO2: (!) 94 % (08/04/18 1618)  BP Location: Right arm    Vital Signs Range (Last 24H):  Temp:  [97.5 °F (36.4 °C)-100.7 °F (38.2 °C)]   Pulse:  [50-89]   Resp:  [14-20]   BP: (142-174)/(70-87)   SpO2:  [94 %-96 %]   BP Location: Right arm    Physical Exam   Constitutional: He is oriented to person, place, and time. He appears well-developed and well-nourished.   HENT:   Head: Normocephalic and atraumatic.   Eyes: Pupils are equal, round, and reactive to light.   Neurological: He is alert and oriented to person, place, and time.   weakness   Nursing note and vitals reviewed.      Neurological Exam:   LOC: alert  Attention Span: Good   Language: No aphasia  Articulation: No dysarthria  Orientation: Person, Place, Time   Facial Movement (CN VII): Upper & lower facial weakness on the Left  Gag Reflex: present  Motor: Arm left  Paresis: 4/5  Leg left  Paresis: 4/5  Arm right  Normal 5/5  Leg right Normal 5/5  Tone: Normal tone throughout    Laboratory:  CMP:   Recent Labs  Lab 08/04/18  0448   CALCIUM 8.8   ALBUMIN 3.2*   PROT 6.7      K 3.8   CO2 20*      BUN 12   CREATININE 1.8*    ALKPHOS 63   ALT 11   AST 16   BILITOT 0.7     CBC:   Recent Labs  Lab 08/04/18 0448   WBC 5.08   RBC 5.34   HGB 15.2   HCT 45.5      MCV 85   MCH 28.5   MCHC 33.4     Lipid Panel:   Recent Labs  Lab 08/03/18 0459   CHOL 234*   LDLCALC 159.0   HDL 44   TRIG 155*     Coagulation:   Recent Labs  Lab 08/03/18 0459   INR 1.0   APTT 26.0     Platelet Aggregation Study: No results for input(s): PLTAGG, PLTAGINTERP, PLTAGREGLACO, ADPPLTAGGREG in the last 168 hours.  Hgb A1C:   Recent Labs  Lab 08/03/18 0459   HGBA1C 5.3     TSH:   Recent Labs  Lab 08/03/18 0459   TSH 0.953       Diagnostic Results     Brain Imaging   CT Head w/o contrast 8-2-18 results:  No acute intracranial abnormalities    Right parietal and occipital encephalomalacia suggesting remote infarcts. Some ex vacuo dilatation of the right lateral ventricle.    Atrophy.  Moderate confluent decreased supratentorial white matter attenuation most likely related to chronic nonspecific small vessel disease.    MRI Brain w/o contrast 8-3-18 results:    Multiple acute to subacute infarcts in a right MCA distribution with partially occluding thrombus identified in the petrous segment of the right ICA.  Findings suggest embolic infarcts in the right MCA distribution.    Small focus of gyral enhancement in the right frontal lobe within the infarct, likely secondary to blood-barrier breakdown from recent infarct.    Advanced chronic microvascular ischemic change throughout the supratentorial white matter.    Vessel Imaging   MRA Brain and neck 8-3-18 results:  Multifocal occlusion of the right carotid circulation involving the proximal ICA at the carotid bifurcation as well as the petrous/cavernous segment of the distal right ICA.    Absence of flow within the left vertebral artery which appears new from concomitantly dated MRI brain and concerning for new thrombosis.    Cardiac Imaging   2D Echo 8-3-18 results:  The left atrial volume index is normal    1  - Low normal to mildly depressed left ventricular systolic function (EF 50-55%).     2 - Wall motion abnormalities.     3 - Quantitatively measured LV function is 48%.     4 - Concentric remodeling.     5 - Normal right ventricular systolic function .     6 - The estimated PA systolic pressure is 24 mmHg.       Cristy Pierre NP  [facility]  Department of Vascular Neurology   [facility]

## 2018-08-04 NOTE — ASSESSMENT & PLAN NOTE
73 y.o. male with significant past medical history of stroke 5 yrs ago with left sided deficits, CKD, HIV, and HTN  presented to hospital complaining of multiple falls.  MRI revealed acute R MCA distribution infarction.  Patient did not receive tPA.  No intervention.  Symptoms began or worsened on Wednesday night.  He did not present to the ED until Thursday night.    Antithrombotics for secondary stroke prevention: Antiplatelets: Aspirin: 81 mg daily    Statins for secondary stroke prevention and hyperlipidemia, if present:   Statins: Atorvastatin- 40 mg daily    Aggressive risk factor modification: HTN, HIV     Rehab efforts: PT/OT/SLP to evaluate and treat    Diagnostics ordered/pending: None     VTE prophylaxis: Heparin 5000 units SQ every 8 hours    BP parameters: Infarct: No intervention, SBP <220

## 2018-08-04 NOTE — ASSESSMENT & PLAN NOTE
Area of cytotoxic cerebral edema identified when reviewing brain imaging in the territory of the right middle cerebral artery. There no mass effect associated with it. We will continue to monitor the patients clinical exam for any worsening of symptoms which may indicate expansion of the stroke or the area of the edema resulting in the clinical change. The pattern is suggestive of embolic etiology.

## 2018-08-04 NOTE — PLAN OF CARE
PCP:Nat aHney PA-C    Extended Emergency Contact Information  Primary Emergency Contact: Sg Zelaya   [facility]  Home Phone: [phone]  Relation: Brother  Secondary Emergency Contact: Benjamin Zelaya Jr   [place]  Mobile Phone: [phone]  Relation: Son      [id number] - [place], [place] - [address] AT [address]  [address]  [place] [address]  Phone: [phone] Fax: [phone]    [id number] - [place], [place] - [address] AT [facility] [address]  [address]  [place] [address]  Phone: [phone] Fax: [phone]    Payor: [employer] MEDICARE / Plan: [employer] MEDICARE HMO / Product Type: Capitation /     Patient was independent living alone prior to hospitalization. Awaiting PT/OT/SLP recommendations. Cm will continue to follow.     08/03/18 0820   Discharge Assessment   Assessment Type Discharge Planning Assessment   Confirmed/corrected address and phone number on facesheet? Yes   Assessment information obtained from? Patient;Caregiver   Expected Length of Stay (days) 2   Communicated expected length of stay with patient/caregiver yes   Prior to hospitilization cognitive status: Alert/Oriented   Prior to hospitalization functional status: Independent   Current cognitive status: Alert/Oriented   Current Functional Status: Needs Assistance   Facility Arrived From: Home   Lives With alone  (grandson lives across the street)   Able to Return to Prior Arrangements unable to determine at this time (comments)   Is patient able to care for self after discharge? Unable to determine at this time (comments)   Who are your caregiver(s) and their phone number(s)? Benjamin Zelaya (Son) [phone]   Patient's perception of discharge disposition home or selfcare;home health   Readmission Within The Last 30 Days no previous admission in last 30 days   Patient currently being followed by  outpatient case management? No   Patient currently receives any other outside agency services? No   Equipment Currently Used at Home none   Do you have any problems affording any of your prescribed medications? No   Is the patient taking medications as prescribed? yes   Does the patient have transportation home? Yes   Transportation Available car;family or friend will provide   Does the patient receive services at the [facility]madin Clinic? No   Discharge Plan A Home with family;Home Health   Discharge Plan B Skilled Nursing Facility   Patient/Family In Agreement With Plan yes

## 2018-08-04 NOTE — HOSPITAL COURSE
Patient was admitted to stroke unit for monitoring and further evaluation. . 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  He was started on DAPT (ASA & Plavix), along with atorvastatin 80mg.  He was evaluated by PT/OT/SLP who recommended home with home health with No Driving.    Of note, MRA neck demonstrated absence of flow to the vertebral artery concerning for thrombus.  Patient with no exam symptoms or complaints to correlate this finding.  No prior vessel imaging to compare.  Recommend routine follow up unless new symptoms occur.  2D echo revealed multiple hypokinetic areas with EF of 50-55% with normal [place].

## 2018-08-04 NOTE — SUBJECTIVE & OBJECTIVE
Neurologic Chief Complaint: left facial droop, multiple falls    Subjective:     Interval History: Patient is seen for follow-up neurological assessment and treatment recommendations: NO issues overnight, was bradycardic yesterday    HPI, Past Medical, Family, and Social History remains the same as documented in the initial encounter.     Review of Systems   Constitutional: Negative for chills and fever.   Respiratory: Negative for cough and shortness of breath.    Neurological: Positive for weakness.     Scheduled Meds:   aspirin  81 mg Oral Daily    atorvastatin  40 mg Oral Daily    darunavir ethanolate  800 mg Oral Daily with breakfast    ferrous sulfate  325 mg Oral Daily    heparin (porcine)  5,000 Units Subcutaneous Q8H    potassium, sodium phosphates  1 packet Oral Once    sodium chloride 0.9%  3 mL Intravenous Q8H    sulfamethoxazole-trimethoprim 800-160mg  1 tablet Oral Once per day on Mon Wed Fri    tenofovir  300 mg Oral Q48H     Continuous Infusions:   sodium chloride 0.9%       PRN Meds:acetaminophen, labetalol, sodium chloride 0.9%    Objective:     Vital Signs (Most Recent):  Temp: 97.5 °F (36.4 °C) (08/04/18 1618)  Pulse: 64 (08/04/18 1618)  Resp: 18 (08/04/18 1618)  BP: (!) 153/70 (08/04/18 1618)  SpO2: (!) 94 % (08/04/18 1618)  BP Location: Right arm    Vital Signs Range (Last 24H):  Temp:  [97.5 °F (36.4 °C)-100.7 °F (38.2 °C)]   Pulse:  [50-89]   Resp:  [14-20]   BP: (142-174)/(70-87)   SpO2:  [94 %-96 %]   BP Location: Right arm    Physical Exam   Constitutional: He is oriented to person, place, and time. He appears well-developed and well-nourished.   HENT:   Head: Normocephalic and atraumatic.   Eyes: Pupils are equal, round, and reactive to light.   Neurological: He is alert and oriented to person, place, and time.   weakness   Nursing note and vitals reviewed.      Neurological Exam:   LOC: alert  Attention Span: Good   Language: No aphasia  Articulation: No  dysarthria  Orientation: Person, Place, Time   Facial Movement (CN VII): Upper & lower facial weakness on the Left  Gag Reflex: present  Motor: Arm left  Paresis: 4/5  Leg left  Paresis: 4/5  Arm right  Normal 5/5  Leg right Normal 5/5  Tone: Normal tone throughout    Laboratory:  CMP:   Recent Labs  Lab 08/04/18  0448   CALCIUM 8.8   ALBUMIN 3.2*   PROT 6.7      K 3.8   CO2 20*      BUN 12   CREATININE 1.8*   ALKPHOS 63   ALT 11   AST 16   BILITOT 0.7     CBC:   Recent Labs  Lab 08/04/18  0448   WBC 5.08   RBC 5.34   HGB 15.2   HCT 45.5      MCV 85   MCH 28.5   MCHC 33.4     Lipid Panel:   Recent Labs  Lab 08/03/18  0459   CHOL 234*   LDLCALC 159.0   HDL 44   TRIG 155*     Coagulation:   Recent Labs  Lab 08/03/18 0459   INR 1.0   APTT 26.0     Platelet Aggregation Study: No results for input(s): PLTAGG, PLTAGINTERP, PLTAGREGLACO, ADPPLTAGGREG in the last 168 hours.  Hgb A1C:   Recent Labs  Lab 08/03/18 0459   HGBA1C 5.3     TSH:   Recent Labs  Lab 08/03/18  0459   TSH 0.953       Diagnostic Results     Brain Imaging   CT Head w/o contrast 8-2-18 results:  No acute intracranial abnormalities    Right parietal and occipital encephalomalacia suggesting remote infarcts. Some ex vacuo dilatation of the right lateral ventricle.    Atrophy.  Moderate confluent decreased supratentorial white matter attenuation most likely related to chronic nonspecific small vessel disease.    MRI Brain w/o contrast 8-3-18 results:    Multiple acute to subacute infarcts in a right MCA distribution with partially occluding thrombus identified in the petrous segment of the right ICA.  Findings suggest embolic infarcts in the right MCA distribution.    Small focus of gyral enhancement in the right frontal lobe within the infarct, likely secondary to blood-barrier breakdown from recent infarct.    Advanced chronic microvascular ischemic change throughout the supratentorial white matter.    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    Absence of flow within the left vertebral artery which appears new from concomitantly dated MRI brain and concerning for new thrombosis.    Cardiac Imaging   2D Echo 8-3-18 results:  The left atrial volume index is normal    1 - Low normal to mildly depressed left ventricular systolic function (EF 50-55%).     2 - Wall motion abnormalities.     3 - Quantitatively measured LV function is 48%.     4 - Concentric remodeling.     5 - Normal right ventricular systolic function .     6 - The estimated PA systolic pressure is 24 mmHg.

## 2018-08-05 LAB
ALBUMIN SERPL BCP-MCNC: 3.1 G/DL
ALP SERPL-CCNC: 67 U/L
ALT SERPL W/O P-5'-P-CCNC: 10 U/L
ANION GAP SERPL CALC-SCNC: 8 MMOL/L
AST SERPL-CCNC: 15 U/L
BASOPHILS # BLD AUTO: 0.04 K/UL
BASOPHILS NFR BLD: 0.8 %
BILIRUB SERPL-MCNC: 0.2 MG/DL
BUN SERPL-MCNC: 15 MG/DL
CALCIUM SERPL-MCNC: 8.6 MG/DL
CHLORIDE SERPL-SCNC: 108 MMOL/L
CO2 SERPL-SCNC: 21 MMOL/L
CREAT SERPL-MCNC: 2.1 MG/DL
DIFFERENTIAL METHOD: ABNORMAL
EOSINOPHIL # BLD AUTO: 0.2 K/UL
EOSINOPHIL NFR BLD: 3.4 %
ERYTHROCYTE [DISTWIDTH] IN BLOOD BY AUTOMATED COUNT: 14.2 %
EST. GFR  (AFRICAN AMERICAN): 35.5 ML/MIN/1.73 M^2
EST. GFR  (NON AFRICAN AMERICAN): 30.7 ML/MIN/1.73 M^2
GLUCOSE SERPL-MCNC: 106 MG/DL
HCT VFR BLD AUTO: 44.3 %
HGB BLD-MCNC: 15 G/DL
IMM GRANULOCYTES # BLD AUTO: 0.02 K/UL
IMM GRANULOCYTES NFR BLD AUTO: 0.4 %
LYMPHOCYTES # BLD AUTO: 2.3 K/UL
LYMPHOCYTES NFR BLD: 44.7 %
MCH RBC QN AUTO: 29.1 PG
MCHC RBC AUTO-ENTMCNC: 33.9 G/DL
MCV RBC AUTO: 86 FL
MONOCYTES # BLD AUTO: 0.6 K/UL
MONOCYTES NFR BLD: 11.9 %
NEUTROPHILS # BLD AUTO: 2 K/UL
NEUTROPHILS NFR BLD: 38.8 %
NRBC BLD-RTO: 0 /100 WBC
PLATELET # BLD AUTO: 231 K/UL
PMV BLD AUTO: 8.1 FL
POTASSIUM SERPL-SCNC: 4 MMOL/L
PROT SERPL-MCNC: 6.4 G/DL
RBC # BLD AUTO: 5.16 M/UL
SODIUM SERPL-SCNC: 137 MMOL/L
WBC # BLD AUTO: 5.03 K/UL

## 2018-08-05 PROCEDURE — 20600001 HC STEP DOWN PRIVATE ROOM

## 2018-08-05 PROCEDURE — 80053 COMPREHEN METABOLIC PANEL: CPT

## 2018-08-05 PROCEDURE — 36415 COLL VENOUS BLD VENIPUNCTURE: CPT

## 2018-08-05 PROCEDURE — 25000003 PHARM REV CODE 250: Performed by: NURSE PRACTITIONER

## 2018-08-05 PROCEDURE — 99233 SBSQ HOSP IP/OBS HIGH 50: CPT | Mod: ,,, | Performed by: PSYCHIATRY & NEUROLOGY

## 2018-08-05 PROCEDURE — A4216 STERILE WATER/SALINE, 10 ML: HCPCS | Performed by: NURSE PRACTITIONER

## 2018-08-05 PROCEDURE — 63600175 PHARM REV CODE 636 W HCPCS: Performed by: NURSE PRACTITIONER

## 2018-08-05 PROCEDURE — 85025 COMPLETE CBC W/AUTO DIFF WBC: CPT

## 2018-08-05 RX ORDER — CLOPIDOGREL BISULFATE 75 MG/1
75 TABLET ORAL DAILY
Status: DISCONTINUED | OUTPATIENT
Start: 2018-08-05 | End: 2018-08-06 | Stop reason: HOSPADM

## 2018-08-05 RX ADMIN — ACETAMINOPHEN 650 MG: 325 TABLET, FILM COATED ORAL at 10:08

## 2018-08-05 RX ADMIN — CLOPIDOGREL 75 MG: 75 TABLET, FILM COATED ORAL at 01:08

## 2018-08-05 RX ADMIN — ATORVASTATIN CALCIUM 40 MG: 20 TABLET, FILM COATED ORAL at 09:08

## 2018-08-05 RX ADMIN — HEPARIN SODIUM 5000 UNITS: 5000 INJECTION, SOLUTION INTRAVENOUS; SUBCUTANEOUS at 10:08

## 2018-08-05 RX ADMIN — FERROUS SULFATE TAB EC 325 MG (65 MG FE EQUIVALENT) 325 MG: 325 (65 FE) TABLET DELAYED RESPONSE at 09:08

## 2018-08-05 RX ADMIN — HEPARIN SODIUM 5000 UNITS: 5000 INJECTION, SOLUTION INTRAVENOUS; SUBCUTANEOUS at 01:08

## 2018-08-05 RX ADMIN — TENOFOVIR DISOPROXIL FUMARATE 300 MG: 300 TABLET, FILM COATED ORAL at 04:08

## 2018-08-05 RX ADMIN — Medication 3 ML: at 01:08

## 2018-08-05 RX ADMIN — Medication 3 ML: at 05:08

## 2018-08-05 RX ADMIN — DARUNAVIR 800 MG: 800 TABLET, FILM COATED ORAL at 08:08

## 2018-08-05 RX ADMIN — Medication 3 ML: at 10:08

## 2018-08-05 RX ADMIN — ASPIRIN 81 MG: 81 TABLET, COATED ORAL at 09:08

## 2018-08-05 RX ADMIN — HEPARIN SODIUM 5000 UNITS: 5000 INJECTION, SOLUTION INTRAVENOUS; SUBCUTANEOUS at 05:08

## 2018-08-05 NOTE — PROGRESS NOTES
[facility]  Vascular Neurology  Comprehensive Stroke Center  Progress Note    Assessment/Plan:     * Embolic stroke involving right middle cerebral artery    73 y.o. male with significant past medical history of stroke 5 yrs ago with left sided deficits, CKD, HIV, and HTN  presented to hospital complaining of multiple falls.  MRI revealed acute R MCA distribution infarction.  Patient did not receive tPA.  No intervention.  Symptoms began or worsened on Wednesday night.  He did not present to the ED until Thursday night.    Antithrombotics for secondary stroke prevention: DAPT    Statins for secondary stroke prevention and hyperlipidemia, if present:   Statins: Atorvastatin- 40 mg daily    Aggressive risk factor modification: HTN, HIV     Rehab efforts: PT/OT/SLP to evaluate and treat    Diagnostics ordered/pending: None     VTE prophylaxis: Heparin 5000 units SQ every 8 hours    BP parameters: Infarct: No intervention, SBP <220            Cytotoxic cerebral edema    Area of cytotoxic cerebral edema identified when reviewing brain imaging in the territory of the right middle cerebral artery. There no mass effect associated with it. We will continue to monitor the patients clinical exam for any worsening of symptoms which may indicate expansion of the stroke or the area of the edema resulting in the clinical change. The pattern is suggestive of embolic etiology.              CKD (chronic kidney disease), stage III    -Hx CKD.  Baseline creatinine 1.8-2.1.  Avoid nephrotoxins if possible.        HTN (hypertension)    -Stroke risk factor.  SBP<220 for now.  -Resume home meds when appropriate        HIV infection    -Stroke risk factor.  Continue home meds.             72 y/o male with Multiple acute to subacute infarcts in a right MCA distribution with partially occluding thrombus identified in the petrous segment of the right ICA.   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  8-5-18 patient and grandson informed of occlusions in brain and to how to try prevent them in future including not smoking and diet choices, started on Plavix    STROKE DOCUMENTATION        NIH Scale:  1a. Level Of Consciousness: 0-->Alert: keenly responsive  1b. LOC Questions: 0-->Answers both questions correctly  1c. LOC Commands: 0-->Performs both tasks correctly  2. Best Gaze: 0-->Normal  3. Visual: 0-->No visual loss  4. Facial Palsy: 1-->Minor paralysis (flattened nasolabial fold, asymmetry on smiling)  5a. Motor Arm, Left: 1-->Drift: limb holds 90 (or 45) degrees, but drifts down before full 10 seconds: does not hit bed or other support  5b. Motor Arm, Right: 0-->No drift: limb holds 90 (or 45) degrees for full 10 secs  6a. Motor Leg, Left: 1-->Drift: leg falls by the end of the 5-sec period but does not hit bed  6b. Motor Leg, Right: 0-->No drift: leg holds 30 degree position for full 5 secs  7. Limb Ataxia: 0-->Absent  8. Sensory: 0-->Normal: no sensory loss  9. Best Language: 0-->No aphasia: normal  10. Dysarthria: 0-->Normal  11. Extinction and Inattention (formerly Neglect): 0-->No abnormality  Total (NIH Stroke Scale): 3       Modified Christiana Score: 1  Reading Coma Scale:    ABCD2 Score:    [id number] Score:   HAS -BLED Score:   ICH Score:   Hunt & Barriga Classification:      Hemorrhagic change of an Ischemic Stroke: Does this patient have an ischemic stroke with hemorrhagic changes? No     Neurologic Chief Complaint: left facial droop, multiple falls    Subjective:     Interval History: Patient is seen for follow-up neurological assessment and treatment recommendations: NO issues overnight, no further bradycardia probable d/c in am    HPI, Past  Medical, Family, and Social History remains the same as documented in the initial encounter.     Review of Systems   Constitutional: Negative for chills and fever.   Respiratory: Negative for cough and shortness of breath.    Neurological: Positive for weakness.     Scheduled Meds:   aspirin  81 mg Oral Daily    atorvastatin  40 mg Oral Daily    darunavir ethanolate  800 mg Oral Daily with breakfast    ferrous sulfate  325 mg Oral Daily    heparin (porcine)  5,000 Units Subcutaneous Q8H    potassium, sodium phosphates  1 packet Oral Once    sodium chloride 0.9%  3 mL Intravenous Q8H    sulfamethoxazole-trimethoprim 800-160mg  1 tablet Oral Once per day on Mon Wed Fri    tenofovir  300 mg Oral Q48H     Continuous Infusions:   sodium chloride 0.9%       PRN Meds:acetaminophen, labetalol, sodium chloride 0.9%    Objective:     Vital Signs (Most Recent):  Temp: 98.9 °F (37.2 °C) (08/05/18 0729)  Pulse: (!) 56 (08/05/18 0729)  Resp: 12 (08/05/18 0729)  BP: (!) 174/89 (08/05/18 0729)  SpO2: 95 % (08/05/18 0729)  BP Location: Left arm    Vital Signs Range (Last 24H):  Temp:  [97.5 °F (36.4 °C)-98.9 °F (37.2 °C)]   Pulse:  [56-73]   Resp:  [12-20]   BP: (133-174)/(64-89)   SpO2:  [94 %-98 %]   BP Location: Left arm    Physical Exam   Constitutional: He is oriented to person, place, and time. He appears well-developed and well-nourished.   HENT:   Head: Normocephalic and atraumatic.   Eyes: Pupils are equal, round, and reactive to light.   Neurological: He is alert and oriented to person, place, and time.   weakness   Nursing note and vitals reviewed.      Neurological Exam:   LOC: alert  Attention Span: Good   Language: No aphasia  Articulation: No dysarthria  Orientation: Person, Place, Time   Facial Movement (CN VII): Upper & lower facial weakness on the Left  Gag Reflex: present  Motor: Arm left  Paresis: 4/5  Leg left  Paresis: 4/5  Arm right  Normal 5/5  Leg right Normal 5/5  Tone: Normal tone  throughout    Laboratory:  CMP:     Recent Labs  Lab 08/05/18  0538   CALCIUM 8.6*   ALBUMIN 3.1*   PROT 6.4      K 4.0   CO2 21*      BUN 15   CREATININE 2.1*   ALKPHOS 67   ALT 10   AST 15   BILITOT 0.2     CBC:     Recent Labs  Lab 08/05/18  0538   WBC 5.03   RBC 5.16   HGB 15.0   HCT 44.3      MCV 86   MCH 29.1   MCHC 33.9     Lipid Panel:     Recent Labs  Lab 08/03/18  0459   CHOL 234*   LDLCALC 159.0   HDL 44   TRIG 155*     Coagulation:     Recent Labs  Lab 08/03/18 0459   INR 1.0   APTT 26.0     Platelet Aggregation Study: No results for input(s): PLTAGG, PLTAGINTERP, PLTAGREGLACO, ADPPLTAGGREG in the last 168 hours.  Hgb A1C:     Recent Labs  Lab 08/03/18 0459   HGBA1C 5.3     TSH:     Recent Labs  Lab 08/03/18 0459   TSH 0.953       Diagnostic Results     Brain Imaging   CT Head w/o contrast 8-2-18 results:  No acute intracranial abnormalities    Right parietal and occipital encephalomalacia suggesting remote infarcts. Some ex vacuo dilatation of the right lateral ventricle.    Atrophy.  Moderate confluent decreased supratentorial white matter attenuation most likely related to chronic nonspecific small vessel disease.    MRI Brain w/o contrast 8-3-18 results:    Multiple acute to subacute infarcts in a right MCA distribution with partially occluding thrombus identified in the petrous segment of the right ICA.  Findings suggest embolic infarcts in the right MCA distribution.    Small focus of gyral enhancement in the right frontal lobe within the infarct, likely secondary to blood-barrier breakdown from recent infarct.    Advanced chronic microvascular ischemic change throughout the supratentorial white matter.    Vessel Imaging   MRA Brain and neck 8-3-18 results:  Multifocal occlusion of the right carotid circulation involving the proximal ICA at the carotid bifurcation as well as the petrous/cavernous segment of the distal right ICA.    Absence of flow within the left vertebral  artery which appears new from concomitantly dated MRI brain and concerning for new thrombosis.    Cardiac Imaging   2D Echo 8-3-18 results:  The left atrial volume index is normal    1 - Low normal to mildly depressed left ventricular systolic function (EF 50-55%).     2 - Wall motion abnormalities.     3 - Quantitatively measured LV function is 48%.     4 - Concentric remodeling.     5 - Normal right ventricular systolic function .     6 - The estimated PA systolic pressure is 24 mmHg.       Cristy Pierre, NP  Comprehensive Stroke Center  Department of Vascular Neurology   [facility]

## 2018-08-05 NOTE — SUBJECTIVE & OBJECTIVE
Neurologic Chief Complaint: left facial droop, multiple falls    Subjective:     Interval History: Patient is seen for follow-up neurological assessment and treatment recommendations: NO issues overnight, no further bradycardia probable d/c in am    HPI, Past Medical, Family, and Social History remains the same as documented in the initial encounter.     Review of Systems   Constitutional: Negative for chills and fever.   Respiratory: Negative for cough and shortness of breath.    Neurological: Positive for weakness.     Scheduled Meds:   aspirin  81 mg Oral Daily    atorvastatin  40 mg Oral Daily    darunavir ethanolate  800 mg Oral Daily with breakfast    ferrous sulfate  325 mg Oral Daily    heparin (porcine)  5,000 Units Subcutaneous Q8H    potassium, sodium phosphates  1 packet Oral Once    sodium chloride 0.9%  3 mL Intravenous Q8H    sulfamethoxazole-trimethoprim 800-160mg  1 tablet Oral Once per day on Mon Wed Fri    tenofovir  300 mg Oral Q48H     Continuous Infusions:   sodium chloride 0.9%       PRN Meds:acetaminophen, labetalol, sodium chloride 0.9%    Objective:     Vital Signs (Most Recent):  Temp: 98.9 °F (37.2 °C) (08/05/18 0729)  Pulse: (!) 56 (08/05/18 0729)  Resp: 12 (08/05/18 0729)  BP: (!) 174/89 (08/05/18 0729)  SpO2: 95 % (08/05/18 0729)  BP Location: Left arm    Vital Signs Range (Last 24H):  Temp:  [97.5 °F (36.4 °C)-98.9 °F (37.2 °C)]   Pulse:  [56-73]   Resp:  [12-20]   BP: (133-174)/(64-89)   SpO2:  [94 %-98 %]   BP Location: Left arm    Physical Exam   Constitutional: He is oriented to person, place, and time. He appears well-developed and well-nourished.   HENT:   Head: Normocephalic and atraumatic.   Eyes: Pupils are equal, round, and reactive to light.   Neurological: He is alert and oriented to person, place, and time.   weakness   Nursing note and vitals reviewed.      Neurological Exam:   LOC: alert  Attention Span: Good   Language: No aphasia  Articulation: No  dysarthria  Orientation: Person, Place, Time   Facial Movement (CN VII): Upper & lower facial weakness on the Left  Gag Reflex: present  Motor: Arm left  Paresis: 4/5  Leg left  Paresis: 4/5  Arm right  Normal 5/5  Leg right Normal 5/5  Tone: Normal tone throughout    Laboratory:  CMP:     Recent Labs  Lab 08/05/18  0538   CALCIUM 8.6*   ALBUMIN 3.1*   PROT 6.4      K 4.0   CO2 21*      BUN 15   CREATININE 2.1*   ALKPHOS 67   ALT 10   AST 15   BILITOT 0.2     CBC:     Recent Labs  Lab 08/05/18  0538   WBC 5.03   RBC 5.16   HGB 15.0   HCT 44.3      MCV 86   MCH 29.1   MCHC 33.9     Lipid Panel:     Recent Labs  Lab 08/03/18  0459   CHOL 234*   LDLCALC 159.0   HDL 44   TRIG 155*     Coagulation:     Recent Labs  Lab 08/03/18  0459   INR 1.0   APTT 26.0     Platelet Aggregation Study: No results for input(s): PLTAGG, PLTAGINTERP, PLTAGREGLACO, ADPPLTAGGREG in the last 168 hours.  Hgb A1C:     Recent Labs  Lab 08/03/18  0459   HGBA1C 5.3     TSH:     Recent Labs  Lab 08/03/18  0459   TSH 0.953       Diagnostic Results     Brain Imaging   CT Head w/o contrast 8-2-18 results:  No acute intracranial abnormalities    Right parietal and occipital encephalomalacia suggesting remote infarcts. Some ex vacuo dilatation of the right lateral ventricle.    Atrophy.  Moderate confluent decreased supratentorial white matter attenuation most likely related to chronic nonspecific small vessel disease.    MRI Brain w/o contrast 8-3-18 results:    Multiple acute to subacute infarcts in a right MCA distribution with partially occluding thrombus identified in the petrous segment of the right ICA.  Findings suggest embolic infarcts in the right MCA distribution.    Small focus of gyral enhancement in the right frontal lobe within the infarct, likely secondary to blood-barrier breakdown from recent infarct.    Advanced chronic microvascular ischemic change throughout the supratentorial white matter.    Vessel Imaging   MRA  Brain and neck 8-3-18 results:  Multifocal occlusion of the right carotid circulation involving the proximal ICA at the carotid bifurcation as well as the petrous/cavernous segment of the distal right ICA.    Absence of flow within the left vertebral artery which appears new from concomitantly dated MRI brain and concerning for new thrombosis.    Cardiac Imaging   2D Echo 8-3-18 results:  The left atrial volume index is normal    1 - Low normal to mildly depressed left ventricular systolic function (EF 50-55%).     2 - Wall motion abnormalities.     3 - Quantitatively measured LV function is 48%.     4 - Concentric remodeling.     5 - Normal right ventricular systolic function .     6 - The estimated PA systolic pressure is 24 mmHg.

## 2018-08-05 NOTE — ASSESSMENT & PLAN NOTE
73 y.o. male with significant past medical history of stroke 5 yrs ago with left sided deficits, CKD, HIV, and HTN  presented to hospital complaining of multiple falls.  MRI revealed acute R MCA distribution infarction.  Patient did not receive tPA.  No intervention.  Symptoms began or worsened on Wednesday night.  He did not present to the ED until Thursday night.    Antithrombotics for secondary stroke prevention: DAPT    Statins for secondary stroke prevention and hyperlipidemia, if present:   Statins: Atorvastatin- 40 mg daily    Aggressive risk factor modification: HTN, HIV     Rehab efforts: PT/OT/SLP to evaluate and treat    Diagnostics ordered/pending: None     VTE prophylaxis: Heparin 5000 units SQ every 8 hours    BP parameters: Infarct: No intervention, SBP <220

## 2018-08-05 NOTE — PLAN OF CARE
Problem: Patient Care Overview  Goal: Plan of Care Review  Outcome: Ongoing (interventions implemented as appropriate)  Safety:  call light in reach, patient oriented to room & instructed how to notify nurse if assistance is needed, questions & concerns addressed, bed in lowest position with wheels locked & side rails up X 2, fall precautions followed, patient free from fall & injury thus far this shift;  VTE/bleeding precautions maintained.  Activity:  patient up with assistance, weight shifted at least every other hour.  Neurological:  patient A&O X 4, follows commands,  strength & dorsi/plantarflexion weaker on R upper and lower extremities, neuro checks performed as ordered.  Respiratory:  patient tolerates room air without distress, denies SOB.  Cardiac:  Denies chest pain, BP stable, HR stable, SB on telemetry.  Afebrile this shift.  GI:  Patient tolerates PO intake well, denies nausea, LBM 8/4/2018.  :  patient voids clear yellow urine without foul odor spontaneously & without difficulty, adequate output for shift.  Skin:  CDI.  Devices:  PIV CDI, negative for s/sx of infection & infiltration.  Pain:  patient received prn pain medication as ordered and expressed relief. Notified Dr. Yañez at 1910 that pt has new HA behind R eye and a VERY slightly/nearly imperceptible difference in L facial droop (his L side droops with smile at baseline this hospital stay, when he smiles the teeth do not show), other neuro abnormalities remained stable and the same as baseline AM assessment (/push pulls and pulses bilaterally)  RN notified NOC shift RN this change just occurred and Dr. Yañez stated she would come to bedside to assess patient.

## 2018-08-06 ENCOUNTER — TELEPHONE (OUTPATIENT)
Dept: NEUROLOGY | Facility: CLINIC | Age: 73
End: 2018-08-06

## 2018-08-06 VITALS
DIASTOLIC BLOOD PRESSURE: 76 MMHG | OXYGEN SATURATION: 96 % | RESPIRATION RATE: 18 BRPM | WEIGHT: 168 LBS | TEMPERATURE: 99 F | HEART RATE: 56 BPM | SYSTOLIC BLOOD PRESSURE: 144 MMHG | HEIGHT: 69 IN | BODY MASS INDEX: 24.88 KG/M2

## 2018-08-06 PROCEDURE — 97535 SELF CARE MNGMENT TRAINING: CPT

## 2018-08-06 PROCEDURE — 25000003 PHARM REV CODE 250: Performed by: NURSE PRACTITIONER

## 2018-08-06 PROCEDURE — 99233 SBSQ HOSP IP/OBS HIGH 50: CPT | Mod: ,,, | Performed by: PSYCHIATRY & NEUROLOGY

## 2018-08-06 PROCEDURE — 97112 NEUROMUSCULAR REEDUCATION: CPT

## 2018-08-06 PROCEDURE — G8996 SWALLOW CURRENT STATUS: HCPCS | Mod: CJ

## 2018-08-06 PROCEDURE — G8998 SWALLOW D/C STATUS: HCPCS | Mod: CJ

## 2018-08-06 PROCEDURE — G8989 SELF CARE D/C STATUS: HCPCS | Mod: CK

## 2018-08-06 PROCEDURE — 99232 SBSQ HOSP IP/OBS MODERATE 35: CPT | Mod: ,,, | Performed by: NURSE PRACTITIONER

## 2018-08-06 PROCEDURE — A4216 STERILE WATER/SALINE, 10 ML: HCPCS | Performed by: NURSE PRACTITIONER

## 2018-08-06 PROCEDURE — G8997 SWALLOW GOAL STATUS: HCPCS | Mod: CH

## 2018-08-06 PROCEDURE — 92507 TX SP LANG VOICE COMM INDIV: CPT

## 2018-08-06 PROCEDURE — G8988 SELF CARE GOAL STATUS: HCPCS | Mod: CJ

## 2018-08-06 PROCEDURE — 63600175 PHARM REV CODE 636 W HCPCS: Performed by: NURSE PRACTITIONER

## 2018-08-06 RX ORDER — ATORVASTATIN CALCIUM 20 MG/1
80 TABLET, FILM COATED ORAL DAILY
Status: DISCONTINUED | OUTPATIENT
Start: 2018-08-07 | End: 2018-08-06 | Stop reason: HOSPADM

## 2018-08-06 RX ORDER — CLOPIDOGREL BISULFATE 75 MG/1
75 TABLET ORAL DAILY
Qty: 30 TABLET | Refills: 11 | Status: SHIPPED | OUTPATIENT
Start: 2018-08-07 | End: 2019-08-07

## 2018-08-06 RX ORDER — ATORVASTATIN CALCIUM 80 MG/1
80 TABLET, FILM COATED ORAL DAILY
Qty: 90 TABLET | Refills: 3 | Status: SHIPPED | OUTPATIENT
Start: 2018-08-07 | End: 2019-08-07

## 2018-08-06 RX ADMIN — ATORVASTATIN CALCIUM 40 MG: 20 TABLET, FILM COATED ORAL at 09:08

## 2018-08-06 RX ADMIN — FERROUS SULFATE TAB EC 325 MG (65 MG FE EQUIVALENT) 325 MG: 325 (65 FE) TABLET DELAYED RESPONSE at 09:08

## 2018-08-06 RX ADMIN — ASPIRIN 81 MG: 81 TABLET, COATED ORAL at 09:08

## 2018-08-06 RX ADMIN — HEPARIN SODIUM 5000 UNITS: 5000 INJECTION, SOLUTION INTRAVENOUS; SUBCUTANEOUS at 05:08

## 2018-08-06 RX ADMIN — DARUNAVIR 800 MG: 800 TABLET, FILM COATED ORAL at 09:08

## 2018-08-06 RX ADMIN — SULFAMETHOXAZOLE AND TRIMETHOPRIM 1 TABLET: 800; 160 TABLET ORAL at 09:08

## 2018-08-06 RX ADMIN — Medication 3 ML: at 05:08

## 2018-08-06 RX ADMIN — CLOPIDOGREL 75 MG: 75 TABLET, FILM COATED ORAL at 09:08

## 2018-08-06 NOTE — TELEPHONE ENCOUNTER
----- Message from Karly Garcia RN sent at 8/6/2018 11:21 AM CDT -----  Contact: Karly Lockhart  Please schedule a hospital follow up appt in 4-6 weeks. The patient was seen by Dr. Ramos while in patient. Please call the patient with date and time of appt.

## 2018-08-06 NOTE — PT/OT/SLP DISCHARGE
Occupational Therapy Discharge Summary    Benjamin Zelaya  MRN: [id number]   Principal Problem: Embolic stroke involving right middle cerebral artery      Patient Discharged from acute Occupational Therapy on 8/6.  Please refer to prior OT note dated 8/6 for functional status.    Assessment:      Patient appropriate for care in another setting.    Objective:     GOALS:    Occupational Therapy Goals        Problem: Occupational Therapy Goal    Goal Priority Disciplines Outcome Interventions   Occupational Therapy Goal     OT, PT/OT Ongoing (interventions implemented as appropriate)    Description:  Goals to be met by: 8/10/18     Patient will increase functional independence with ADLs by performing:    UE Dressing with Supervision.  LE Dressing with Supervision.  Grooming while standing with Supervision.  Toileting from toilet with Supervision for hygiene and clothing management.   Supine to sit with Modified Jackson.  Stand pivot transfers with Modified Jackson.                      Reasons for Discontinuation of Therapy Services  Transfer to alternate level of care.      Plan:     Patient Discharged to: Outpatient Therapy Services    MELODY Hernandez  8/6/2018

## 2018-08-06 NOTE — PT/OT/SLP PROGRESS
"Occupational Therapy   Treatment    Name: Benjamin Zelaya  MRN: [id number]  Admitting Diagnosis:  Embolic stroke involving right middle cerebral artery       Recommendations:     Discharge Recommendations: outpatient OT  Discharge Equipment Recommendations:  none  Barriers to discharge:  None    Subjective   Patient:  "I am about the same.  My speech had changed and my face was sagging.  My dog is going to be glad when I get home."  "Before this, I had help to tie my shoes."  Communicated with: Nurse prior to session.  Pain/Comfort:  · Pain Rating 1: 0/10  · Pain Rating Post-Intervention 1: 0/10    Patients cultural, spiritual, Druze conflicts given the current situation: Mu-ism    Objective:     Patient found with: telemetry, peripheral IV  Grandchildren present.  General Precautions: Standard, aspiration, fall   Orthopedic Precautions:N/A   Braces: N/A     Occupational Performance:    Bed Mobility:    · Patient completed Rolling/Turning to Left with  modified independence  · Patient completed Scooting/Bridging with modified independence  · Patient completed Supine to Sit with modified independence  · Patient completed Sit to Supine with modified independence     Functional Mobility/Transfers:  · Patient completed Sit <> Stand Transfer with modified independence  with  no assistive device   · Patient completed Bed <> Chair Transfer using Stand Pivot technique with modified independence with no assistive device    Activities of Daily Living:  · Grooming: Supervision with left UE in weight bearing  · Upper Body Dressing: SBA with donning pull over clothing  · Lower Body Dressing: Min assist with tying laces    Patient left supine with all lines intact and call button in reach    AMPA 6 Click:  [facility] Total Score: 18    Treatment & Education:  Patient education provided for stroke warning signs, prevention guidelines and personal risk factors.  Patient verbalizing understanding via teach back method.   Patient " education provided on role of OT and need for outpt OT upon discharge.  Patient education provided on hemiplegic dressing technique, left UE weight bearing and positioning. .  Continued education, patient/ family training recommended.   P/AAROM performed left UE one set x 10 rep in all planes of motion with stretches provided at end range; sustained stretch provided for supination, external rotation, wrist extension and shoulder flexion.  MCP mobilizations performed.  Assistance and facilitation provided for upward rotation of the scapula during shoulder flexion and abduction.  Patient alert and oriented x 3; able to follow 4/4 one step commands.   Patient's functional status and disposition recommendation discussed with stroke team in daily rounds.  White board updated in patient's room.  OT asked if there were any other questions; patient/ family had no further questions.       Education:    Assessment:     Benjamin Zelaya is a 73 y.o. male with a medical diagnosis of Embolic stroke involving right middle cerebral artery.  He presents with performance deficits affecting function are impaired functional mobilty, impaired self care skills, decreased upper extremity function, abnormal tone, impaired fine motor, impaired coordination.  These performance deficits have resulted in activity limitations including but not limited to:   upper body dressing, lower body dressing, toileting, bathing, and carrying objects.   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 Patient will benefit from skilled OT services to maximize level of independence with self-care skills and functional mobility.  Will benefit from outpt OT.    Rehab Prognosis:  Good; patient would benefit from acute skilled OT services to address these deficits and reach maximum level of function.       Plan:     Patient to be seen 3 x/week to address the above listed problems via self-care/home management,  therapeutic activities, therapeutic exercises, neuromuscular re-education  · Plan of Care Expires: 09/02/18  · Plan of Care Reviewed with: patient    This Plan of care has been discussed with the patient who was involved in its development and understands and is in agreement with the identified goals and treatment plan    GOALS:    Occupational Therapy Goals        Problem: Occupational Therapy Goal    Goal Priority Disciplines Outcome Interventions   Occupational Therapy Goal     OT, PT/OT Ongoing (interventions implemented as appropriate)    Description:  Goals to be met by: 8/10/18     Patient will increase functional independence with ADLs by performing:    UE Dressing with Supervision.  LE Dressing with Supervision.  Grooming while standing with Supervision.  Toileting from toilet with Supervision for hygiene and clothing management.   Supine to sit with Modified [place].  Stand pivot transfers with Modified [place].                      Time Tracking:     OT Date of Treatment: 08/06/18  OT Start Time: 0636  OT Stop Time: 0659  OT Total Time (min): 23 min    Billable Minutes:Self Care/Home Management 10  Neuromuscular Re-education 13    MELODY Hernandez  8/6/2018

## 2018-08-06 NOTE — PLAN OF CARE
Problem: Occupational Therapy Goal  Goal: Occupational Therapy Goal  Goals to be met by: 8/10/18     Patient will increase functional independence with ADLs by performing:    UE Dressing with Supervision.  LE Dressing with Supervision.  Grooming while standing with Supervision.  Toileting from toilet with Supervision for hygiene and clothing management.   Supine to sit with Modified [place].  Stand pivot transfers with Modified [place].     Goals remain appropriate.  MELODY Hernandez  8/6/2018

## 2018-08-06 NOTE — PLAN OF CARE
Problem: Patient Care Overview  Goal: Plan of Care Review  Outcome: Ongoing (interventions implemented as appropriate)  Safety:  bed alarm audible and active throughout shift, call light in reach, patient oriented to room & instructed how to notify nurse if assistance is needed, questions & concerns addressed, bed in lowest position with wheels locked & side rails up X 2, fall precautions followed, patient free from fall & injury thus far this shift;  VTE/bleeding precautions maintained.  Activity:  patient up with assistance, weight shifted at least every other hour.  Neurological:  patient A&O X 4, follows commands,  strength & dorsi/plantarflexion weaker on R upper and lower extremities, neuro checks performed as ordered, no acute changes this shift besides patient having HA behind R eye, administered acetaminophen as ordered and patient expressed relief.  Respiratory:  patient tolerates room air without distress, denies SOB.  Cardiac:  Denies chest pain, BP stable, HR stable, SB on telemetry.  Afebrile this shift.  GI:  Patient tolerates PO intake well, denies nausea, LBM 8/5/2018.  :  patient voids clear yellow urine without foul odor spontaneously & without difficulty, adequate output for shift.  Skin:  CDI.  Devices:  PIV CDI, negative for s/sx of infection & infiltration.  Pain:  patient denied any other pain besides HA as noted above.

## 2018-08-06 NOTE — PROGRESS NOTES
[facility]  Physical Medicine & Rehab  Progress Note    Patient Name: Benjamin Zelaya  MRN: [id number]  Admission Date: 8/2/2018  Length of Stay: 3 days  Attending Physician: Joey Ramos MD    Subjective:     Principal Problem:Embolic stroke involving right middle cerebral artery    Hospital Course:   8/3/18:  Evaluated by PT, OT, and SLP.  Found to have mild oral dysphagia, dysarthria, mild higher-level cognitive impairment.  SLP recommendation: dental soft diet and thin liquids.  Bed mobility mod(I)/(I)/SV.  Sit to stand SBA and transfers SBA.  Ambulated 100 ft SV.  Ascended and descended 5 stairs SV.  UBD maxA (usually wears pull-over shirts) and LBD SBA.  8/6/18:  Participated with OT.  Bed mobility mod(I).  Sit to stand and transfers mod(I).  UBD SBA and LBD [place].  Grooming SV.     Interval History 8/6/2018:  Patient is seen for follow-up rehab evaluation and recommendations: No acute events over night.  Without complaints.  Participating with therapy.     HPI, Past Medical, Family, and Social History remains the same as documented in the initial encounter.    Scheduled Medications:    aspirin  81 mg Oral Daily    atorvastatin  40 mg Oral Daily    clopidogrel  75 mg Oral Daily    darunavir ethanolate  800 mg Oral Daily with breakfast    ferrous sulfate  325 mg Oral Daily    heparin (porcine)  5,000 Units Subcutaneous Q8H    potassium, sodium phosphates  1 packet Oral Once    sodium chloride 0.9%  3 mL Intravenous Q8H    sulfamethoxazole-trimethoprim 800-160mg  1 tablet Oral Once per day on Mon Wed Fri    tenofovir  300 mg Oral Q48H     PRN Medications: acetaminophen, labetalol, sodium chloride 0.9%    Review of Systems   Constitutional: Negative for chills, fatigue and fever.   HENT: Negative for drooling, hearing loss, trouble swallowing and voice change.    Eyes: Negative for pain and visual disturbance.   Respiratory: Negative for cough, shortness of breath and wheezing.     Cardiovascular: Negative for chest pain and palpitations.   Gastrointestinal: Negative for abdominal pain, nausea and vomiting.   Genitourinary: Negative for difficulty urinating and flank pain.   Musculoskeletal: Negative for arthralgias, back pain, myalgias and neck pain.   Skin: Negative for rash and wound.   Neurological: Positive for weakness. Negative for dizziness, numbness and headaches.   Psychiatric/Behavioral: Negative for agitation and hallucinations. The patient is not nervous/anxious.      Objective:     Vital Signs (Most Recent):  Temp: 98 °F (36.7 °C) (18 0940)  Pulse: 62 (18 0940)  Resp: 18 (18)  BP: 131/67 (18 0940)  SpO2: 96 % (18)    Vital Signs (24h Range):  Temp:  [97.3 °F (36.3 °C)-98.6 °F (37 °C)] 98 °F (36.7 °C)  Pulse:  [50-71] 62  Resp:  [12-18] 18  SpO2:  [95 %-99 %] 96 %  BP: (126-171)/(67-80) 131/67     Physical Exam   Constitutional: He is oriented to person, place, and time. He appears well-developed and well-nourished. No distress.   HENT:   Head: Normocephalic and atraumatic.   Right Ear: External ear normal.   Left Ear: External ear normal.   Nose: Nose normal.   Eyes: Right eye exhibits no discharge. Left eye exhibits no discharge. No scleral icterus.   Neck: Normal range of motion.   Cardiovascular: Normal rate, regular rhythm and intact distal pulses.    Pulmonary/Chest: Effort normal. No respiratory distress. He has no wheezes.   Abdominal: Soft. He exhibits no distension. There is no tenderness.   Musculoskeletal: Normal range of motion. He exhibits no edema or tenderness.   Neurological: He is alert and oriented to person, place, and time. He exhibits normal muscle tone.   -  Mental Status:  AAOx3.  Follows commands.  Answers correct age and .  Recent and remote memory intact.  -  Speech and language:  no aphasia or dysarthria.    -  Vision:  no hemianopsia or ptosis.    -  Facial movement (CN VII): mild facial droop.  -  Motor:   RUE: 5/5.  LUE: 4/5.  RLE: 5/5.  LLE: 5-/5.  -  Tone:  Increased LUE.  -  Sensory:  Intact to light touch and pin prick.   Skin: Skin is warm and dry. No rash noted.   Psychiatric: He has a normal mood and affect. His behavior is normal. Thought content normal.   Vitals reviewed.    Diagnostic Results:   Labs: Reviewed  X-Ray: Reviewed  CT: Reviewed  MRI: Reviewed    Assessment/Plan:      * Embolic stroke involving right middle cerebral artery    -  Presented for general weakness, dizziness, and gait instability x 1 week with subsequent falls, slurred speech x 2 days, and new onset left-sided facial droop  -  CTH without acute pathology--> not tPA candidate 2/2 outside of treatment window  -  MRI brain revealed acute/subacute R MCA infarct, suggestive of embolic etiology  -  MRA brain and neck showed multifocal occlusion of R carotid circulation involving R proximal ICA at the carotid bifurcation and R distal ICA and absence of flow within L vertebral artery concerning for new thrombosis  -  Started on Plavix  See hospital course for functional, cognitive/speech/language, and nutrition/swallow status.      Recommendations  -  Encourage mobility, OOB in chair, and early ambulation as appropriate   -  PT/OT evaluate and treat  -  SLP speech and cognitive evaluate and treat  -  Monitor mood and sleep disturbances  -  Establish consistent sleep-wake cycle  -  Monitor for bowel and bladder dysfunction  -  Monitor for shoulder pain and subluxation  -  Monitor for spasticity  -  DVT prophylaxis  -  Monitor for and prevent skin breakdown and pressure ulcers  · Early mobility, repositioning/weight shifting every 20-30 minutes when sitting, turn patient every 2 hours, proper mattress/overlay and chair cushioning, pressure relief/heel protector boots        Cytotoxic cerebral edema    -  2/2 stroke        HTN (hypertension)    -  Stroke risk factor        HIV infection    -  Stroke risk factor  -  On home meds        Doing  well with therapy.  Near premorbid level of function and with limited rehab goals.  Agree with therapy recommendation for outpatient PT, OT, and SLP.  Will sign off.  Please call with questions/concerns or re-consult if situation changes.    ANIKET Valdez  Department of Physical Medicine & Rehab   [facility]

## 2018-08-06 NOTE — ASSESSMENT & PLAN NOTE
73 y.o. male with significant past medical history of stroke 5 yrs ago with left sided deficits, CKD, HIV, and HTN  presented to hospital complaining of multiple falls.  MRI revealed acute R MCA distribution infarction.  Patient did not receive tPA.  No intervention.  Symptoms began or worsened on Wednesday night.  He did not present to the ED until Thursday night.    Exam stable; no new symptoms.  Atorvastatin increased for maximal medical management of intracranial atherosclerosis.  Patient ready for discharge to home with home health.      MRA neck demonstrated absence of flow to the vertebral artery concerning for thrombus.  Patient with no exam symptoms or complaints to correlate this finding.  No prior vessel imaging to compare.  Recommend routine follow up unless new symptoms occur.    Antithrombotics for secondary stroke prevention: DAPT - ASA 81mg with Plavix 75mg    Statins for secondary stroke prevention and hyperlipidemia, if present:   Statins: Atorvastatin- 80 mg daily    Aggressive risk factor modification: HTN, HIV     Rehab efforts: PT/OT/SLP to evaluate and treat    Diagnostics ordered/pending: None     VTE prophylaxis: Heparin 5000 units SQ every 8 hours    BP parameters: SBP < 160

## 2018-08-06 NOTE — PROGRESS NOTES
[facility]  Vascular Neurology  Comprehensive Stroke Center  Progress Note    Assessment/Plan:     * Embolic stroke involving right middle cerebral artery    73 y.o. male with significant past medical history of stroke 5 yrs ago with left sided deficits, CKD, HIV, and HTN  presented to hospital complaining of multiple falls.  MRI revealed acute R MCA distribution infarction.  Patient did not receive tPA.  No intervention.  Symptoms began or worsened on Wednesday night.  He did not present to the ED until Thursday night.    Exam stable; no new symptoms.  Atorvastatin increased for maximal medical management of intracranial atherosclerosis.  Patient ready for discharge to home with home health.      MRA neck demonstrated absence of flow to the vertebral artery concerning for thrombus.  Patient with no exam symptoms or complaints to correlate this finding.  No prior vessel imaging to compare.  Recommend routine follow up unless new symptoms occur.    Antithrombotics for secondary stroke prevention: DAPT - ASA 81mg with Plavix 75mg    Statins for secondary stroke prevention and hyperlipidemia, if present:   Statins: Atorvastatin- 80 mg daily    Aggressive risk factor modification: HTN, HIV     Rehab efforts: PT/OT/SLP to evaluate and treat    Diagnostics ordered/pending: None     VTE prophylaxis: Heparin 5000 units SQ every 8 hours    BP parameters: SBP < 160            Cytotoxic cerebral edema    Area of cytotoxic cerebral edema identified when reviewing brain imaging in the territory of the right middle cerebral artery. There no mass effect associated with it. We will continue to monitor the patients clinical exam for any worsening of symptoms which may indicate expansion of the stroke or the area of the edema resulting in the clinical change. The pattern is suggestive of embolic etiology.              CKD (chronic kidney disease), stage III    -Hx CKD.  Baseline creatinine 1.8-2.1.  Avoid  nephrotoxins if possible.        HTN (hypertension)    -Stroke risk factor.    -Resume home meds at discharge        HIV infection    -Stroke risk factor.  Continue home meds.             74 y/o male with Multiple acute to subacute infarcts in a right MCA distribution with partially occluding thrombus identified in the petrous segment of the right ICA.   8-3-18 Multifocal occlusion of the right carotid circulation involving the proximal ICA at the carotid bifurcation as well as the petrous/cavernous segment of the distal right ICA.=  Absence of flow within the left vertebral artery which appears new from concomitantly dated MRI brain and concerning for new thrombosis.  8-5-18 patient and grandson informed of occlusions in brain and to how to try prevent them in future including not smoking and diet choices, started on Plavix    STROKE DOCUMENTATION        NIH Scale:  1a. Level Of Consciousness: 0-->Alert: keenly responsive  1b. LOC Questions: 0-->Answers both questions correctly  1c. LOC Commands: 0-->Performs both tasks correctly  2. Best Gaze: 0-->Normal  3. Visual: 0-->No visual loss  4. Facial Palsy: 1-->Minor paralysis (flattened nasolabial fold, asymmetry on smiling)  5a. Motor Arm, Left: 1-->Drift: limb holds 90 (or 45) degrees, but drifts down before full 10 seconds: does not hit bed or other support  5b. Motor Arm, Right: 0-->No drift: limb holds 90 (or 45) degrees for full 10 secs  6a. Motor Leg, Left: 1-->Drift: leg falls by the end of the 5-sec period but does not hit bed  6b. Motor Leg, Right: 0-->No drift: leg holds 30 degree position for full 5 secs  7. Limb Ataxia: 0-->Absent  8. Sensory: 0-->Normal: no sensory loss  9. Best Language: 0-->No aphasia: normal  10. Dysarthria: 0-->Normal  11. Extinction and Inattention (formerly Neglect): 0-->No abnormality  Total (NIH Stroke Scale): 3       Modified Sanders    Nicky Coma Scale:    ABCD2 Score:    [id number] Score:   HAS -BLED Score:   ICH Score:    Cobb & Barriga Classification:      Hemorrhagic change of an Ischemic Stroke: Does this patient have an ischemic stroke with hemorrhagic changes? No     Neurologic Chief Complaint: left facial droop, multiple falls    Subjective:     Interval History: Patient is seen for follow-up neurological assessment and treatment recommendations: No acute issues or events overnight.  Heart range 51-71 bpm.  Remains asymptomatic.  Ready for discharge.    HPI, Past Medical, Family, and Social History remains the same as documented in the initial encounter.     Review of Systems   Constitutional: Negative for fever.   HENT: Negative for trouble swallowing.    Eyes: Negative for visual disturbance.   Respiratory: Negative for cough and shortness of breath.    Neurological: Positive for facial asymmetry and weakness. Negative for speech difficulty.     Scheduled Meds:   aspirin  81 mg Oral Daily    [START ON 8/7/2018] atorvastatin  80 mg Oral Daily    clopidogrel  75 mg Oral Daily    darunavir ethanolate  800 mg Oral Daily with breakfast    ferrous sulfate  325 mg Oral Daily    heparin (porcine)  5,000 Units Subcutaneous Q8H    potassium, sodium phosphates  1 packet Oral Once    sodium chloride 0.9%  3 mL Intravenous Q8H    sulfamethoxazole-trimethoprim 800-160mg  1 tablet Oral Once per day on Mon Wed Fri    tenofovir  300 mg Oral Q48H     Continuous Infusions:   sodium chloride 0.9%       PRN Meds:acetaminophen, labetalol, sodium chloride 0.9%    Objective:     Vital Signs (Most Recent):  Temp: 98.8 °F (37.1 °C) (08/06/18 1203)  Pulse: 61 (08/06/18 1203)  Resp: 18 (08/06/18 1203)  BP: (!) 144/76 (08/06/18 1203)  SpO2: 96 % (08/06/18 1203)  BP Location: Left arm    Vital Signs Range (Last 24H):  Temp:  [97.3 °F (36.3 °C)-98.8 °F (37.1 °C)]   Pulse:  [50-71]   Resp:  [12-18]   BP: (126-171)/(67-80)   SpO2:  [95 %-99 %]   BP Location: Left arm    Physical Exam   Constitutional: He appears well-developed and well-nourished.  No distress.   Skin: Skin is warm and dry.   Nursing note and vitals reviewed.      Neurological Exam:   LOC: alert  Attention Span: Good   Language: No aphasia  Articulation: No dysarthria  Orientation: Person, Place, Time   Visual Fields: Full  EOM (CN III, IV, VI): Full/intact  Pupils (CN II, III): PERRL  Facial Sensation (CN V): Normal  Facial Movement (CN VII): Lower facial weakness on the Left  Motor: Arm left  Paresis: 4/5  Leg left  Paresis: 4/5  Arm right  Normal 5/5  Leg right Normal 5/5  Sensation: Intact to light touch, temperature   Tone: Normal tone throughout    Laboratory:  CMP:   No results for input(s): GLUCOSE, CALCIUM, ALBUMIN, PROT, NA, K, CO2, CL, BUN, CREATININE, ALKPHOS, ALT, AST, BILITOT in the last 24 hours.  CBC:     Recent Labs  Lab 08/05/18  0538   WBC 5.03   RBC 5.16   HGB 15.0   HCT 44.3      MCV 86   MCH 29.1   MCHC 33.9     Lipid Panel:     Recent Labs  Lab 08/03/18  0459   CHOL 234*   LDLCALC 159.0   HDL 44   TRIG 155*     Coagulation:     Recent Labs  Lab 08/03/18  0459   INR 1.0   APTT 26.0     Platelet Aggregation Study: No results for input(s): PLTAGG, PLTAGINTERP, PLTAGREGLACO, ADPPLTAGGREG in the last 168 hours.  Hgb A1C:     Recent Labs  Lab 08/03/18  0459   HGBA1C 5.3     TSH:     Recent Labs  Lab 08/03/18  0459   TSH 0.953       Diagnostic Results     Brain Imaging   CT Head w/o contrast 8-2-18 results:  No acute intracranial abnormalities    Right parietal and occipital encephalomalacia suggesting remote infarcts. Some ex vacuo dilatation of the right lateral ventricle.    Atrophy.  Moderate confluent decreased supratentorial white matter attenuation most likely related to chronic nonspecific small vessel disease.    MRI Brain w/o contrast 8-3-18 results:    Multiple acute to subacute infarcts in a right MCA distribution with partially occluding thrombus identified in the petrous segment of the right ICA.  Findings suggest embolic infarcts in the right MCA  distribution.    Small focus of gyral enhancement in the right frontal lobe within the infarct, likely secondary to blood-barrier breakdown from recent infarct.    Advanced chronic microvascular ischemic change throughout the supratentorial white matter.    Vessel Imaging   MRA Brain and neck 8-3-18 results:  Multifocal occlusion of the right carotid circulation involving the proximal ICA at the carotid bifurcation as well as the petrous/cavernous segment of the distal right ICA.    Absence of flow within the left vertebral artery which appears new from concomitantly dated MRI brain and concerning for new thrombosis.    Cardiac Imaging   2D Echo 8-3-18 results:  The left atrial volume index is normal    1 - Low normal to mildly depressed left ventricular systolic function (EF 50-55%).     2 - Wall motion abnormalities.     3 - Quantitatively measured LV function is 48%.     4 - Concentric remodeling.     5 - Normal right ventricular systolic function .     6 - The estimated PA systolic pressure is 24 mmHg.       Princess Bishop NP  Com[facility] Stroke Center  Department of Vascular Neurology   [facility]

## 2018-08-06 NOTE — PT/OT/SLP PROGRESS
"Speech Language Pathology Treatment & Discharge Summary    Patient Name:  Benjamin Zelaya   MRN:  [id number]  Admitting Diagnosis: Embolic stroke involving right middle cerebral artery    Recommendations:                 General Recommendations:  Dysphagia therapy and Cognitive-linguistic therapy  Diet recommendations:  Dental Soft, Liquid Diet Level: Thin   Aspiration Precautions: 1 bite/sip at a time, Avoid talking while eating, Check for pocketing/oral residue, Frequent oral care, HOB to 90 degrees, Meds whole 1 at a time, Small bites/sips and Strict aspiration precautions   General Precautions: Standard, aspiration, fall  Communication strategies:  go to room if call light pushed    Subjective     SLP reviewed Pt with nurse, nurse clears PT for ST, reports no difficulties noted with meals or medications  Pt presents calm  He explains, "I think with the tylenol it can get down to about a 1 but without the tylenol it gets to be a 5 or so" when talking about headache     Pain/Comfort:  · Pain Rating 1: 2/10  · Location 1: head  · Pain Addressed 1: Nurse notified, Distraction, Pre-medicate for activity  · Pain Rating Post-Intervention 1: 2/10    Objective:     Has the patient been evaluated by SLP for swallowing?   Yes  Keep patient NPO? No   Current Respiratory Status: room air      Pt found awake and alert, sitting on edge of bed, finishing bites of pears on lunch tray meal.  Family member sleeping in chair at side of bed. Call light in reach. Patient was oriented x4.  He demonstrated good functional recall of events of the day.  Patient with clear vocal quality with mildly decreased intensity.  Speech deemed intelligible at the structured, conversational level.  He reported no difficulties with meal and shared he had a good appetite.  He declined additional bites/sips from lunch meal tray due to fullness.  He recalled 2 safe swallow strategies Independently. He reported ongoing decreased vision then denied difficulty " with speech, language or cognition.  He was educated on SLP role, ongoing aspiration precautions, clear speech strategies, safety precautions for schedule/time/money/medication management tasks and compensatory strategies for visiospatial tasks.  He was also educated on stroke awareness (FAST) and recommendations for ongoing ST upon d/c from acute. Patient stated he would be continuing with therapy via  upon d/c.  He verbalized partial understanding of SLP recommendations and ongoing review/reinforcement of safety precautions is advised.  No additional questions noted. Whiteboard current. Patient remained in same position with call light in reach upon SLP exit from room.     Assessment:     Benjamin Zelaya is a 73 y.o. male with an SLP diagnosis of Mild Oral Dysphagia, Dysarthria and Mild Higher-level cognitive impairment with Visio-Spatial deficits. He would benefit from ongoing ST upon d/c from acute to continue to educate Patient and family on safety awareness and compensatory strategies for speech, visiospatial skills and swallowing.       Goals:    SLP Goals        Problem: SLP Goal    Goal Priority Disciplines Outcome   SLP Goal     SLP Ongoing (interventions implemented as appropriate)   Description:  Speech Language Pathology Goals  Goals expected to be met by 8/10/18  1. Pt will tolerate dental soft diet with thin liquids w/o overt S/S aspiration, MOD I  2. Pt will tolerate trials of advanced textures with adequate oral clearance and no overt S/S aspiration, MOD I  3. Pt will complete OMEs x10 ea with good effort 90% of attempts, MOD I, to improve labial/mandibular strength and coordination  4. Pt will complete sentence repetition tasks with 90% intelligibility, MOD I, to improve speech   5. Pt will complete fx math tasks with 90% accuracy, Supervision  6. Pt will complete basic visiospatial tasks with 90% accuracy, Supervision  7. Pt will complete further assessment of reading and writing skills   8.  Educate Pt and family on safety precautions and S/S aspiration                         Plan:     · Patient to be seen:  4 x/week   · Plan of Care expires:  09/02/18  · Plan of Care reviewed with:  patient   · SLP Follow-Up:  Yes       Discharge recommendations:  home health speech therapy   Barriers to Discharge:  None    Time Tracking:     SLP Treatment Date:   08/06/18  Speech Start Time:  1242  Speech Stop Time:  1306     Speech Total Time (min):  24 min    Billable Minutes: Speech Therapy Individual 9 and Seld Care/Home Management Training 15    RAFAEL Castillo, [facility]-SLP  Speech-Language Pathology  Pager: [phone]    08/06/2018

## 2018-08-06 NOTE — ASSESSMENT & PLAN NOTE
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      Recommendations  -  Encourage mobility, OOB in chair, and early ambulation as appropriate   -  PT/OT evaluate and treat  -  SLP speech and cognitive evaluate and treat  -  Monitor mood and sleep disturbances  -  Establish consistent sleep-wake cycle  -  Monitor for bowel and bladder dysfunction  -  Monitor for shoulder pain and subluxation  -  Monitor for spasticity  -  DVT prophylaxis  -  Monitor for and prevent skin breakdown and pressure ulcers  · Early mobility, repositioning/weight shifting every 20-30 minutes when sitting, turn patient every 2 hours, proper mattress/overlay and chair cushioning, pressure relief/heel protector boots

## 2018-08-06 NOTE — PLAN OF CARE
Problem: SLP Goal  Goal: SLP Goal  Speech Language Pathology Goals  Goals expected to be met by 8/10/18  1. Pt will tolerate dental soft diet with thin liquids w/o overt S/S aspiration, MOD I  2. Pt will tolerate trials of advanced textures with adequate oral clearance and no overt S/S aspiration, MOD I  3. Pt will complete OMEs x10 ea with good effort 90% of attempts, MOD I, to improve labial/mandibular strength and coordination  4. Pt will complete sentence repetition tasks with 90% intelligibility, MOD I, to improve speech   5. Pt will complete fx math tasks with 90% accuracy, Supervision  6. Pt will complete basic visiospatial tasks with 90% accuracy, Supervision  7. Pt will complete further assessment of reading and writing skills   8. Educate Pt and family on safety precautions and S/S aspiration        Outcome: Ongoing (interventions implemented as appropriate)  Pt progressing with goals. Pt would benefit from ongoing ST upon d/c from acute to continue to improve safety awareness and educate Pt on compensatory strategies for visiospatial skills, speech, and math.  Thank you.    FLAKITO Castillo., [facility]-SLP  Speech-Language Pathology  Pager: [phone]  8/6/2018

## 2018-08-06 NOTE — DISCHARGE SUMMARY
[facility]  Vascular Neurology  Comprehensive Stroke Center  Discharge Summary     Summary:     Admit Date: 8/2/2018 11:02 PM    Discharge Date and Time: 8/6/2018    Attending Physician: Joey Ramos MD     Discharge Provider: Princess Bishop NP    History of Present Illness: Patient is a 73 y.o. male with significant past medical history of stroke 5 yrs ago with left sided deficits, CKD, HIV, and HTN  presented to hospital complaining of multiple falls.  The patient and his family state the patient has been week x 1 week with multiple falls since Wednesday.  2 days ago he had slurred speech, it began Wednesday night.  The patient developed a facial droop ~2130 and subsequently presented to the ED for evaluation.  Of note, the patient lives alone with his dog, Kady.  His family lives across the street and frequently check on him.  He is very reluctant to be admitted to the hospital out of concern for his dog and wanting to eat.  Currently the patient is c/o a HA but has no slurred speech, dizziness, or CP.  He states he has poor vision at baseline and his vision is unchanged.  He also endorses feeling generally weak.  A CTH was obtained in the ED and was negative for acute findings.  An MRI brain was obtained and revealed an acute right MCA distribution infarction.  The patient will be admitted to Vascular Neurology for further evaluation.      Hospital Course (synopsis of major diagnoses, care, treatment, and services provided during the course of the hospital stay): Patient was admitted to stroke unit for monitoring and further evaluation. . 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 This was felt to be the etiology of his stroke.   He was started on DAPT (ASA & Plavix), along with atorvastatin 80mg.  He was evaluated by PT/OT/SLP who  recommended home with home health with No Driving.    Of note, MRA neck demonstrated absence of flow to the vertebral artery concerning for thrombus.  Patient with no exam symptoms or complaints to correlate this finding.  No prior vessel imaging to compare.  Recommend routine follow up unless new symptoms occur.    STROKE DOCUMENTATION         NIH Scale:  1a. Level Of Consciousness: 0-->Alert: keenly responsive  1b. LOC Questions: 0-->Answers both questions correctly  1c. LOC Commands: 0-->Performs both tasks correctly  2. Best Gaze: 0-->Normal  3. Visual: 0-->No visual loss  4. Facial Palsy: 1-->Minor paralysis (flattened nasolabial fold, asymmetry on smiling)  5a. Motor Arm, Left: 1-->Drift: limb holds 90 (or 45) degrees, but drifts down before full 10 seconds: does not hit bed or other support  5b. Motor Arm, Right: 0-->No drift: limb holds 90 (or 45) degrees for full 10 secs  6a. Motor Leg, Left: 1-->Drift: leg falls by the end of the 5-sec period but does not hit bed  6b. Motor Leg, Right: 0-->No drift: leg holds 30 degree position for full 5 secs  7. Limb Ataxia: 0-->Absent  8. Sensory: 0-->Normal: no sensory loss  9. Best Language: 0-->No aphasia: normal  10. Dysarthria: 0-->Normal  11. Extinction and Inattention (formerly Neglect): 0-->No abnormality  Total (NIH Stroke Scale): 3        Modified Ascension Score: 3  Nicky Coma Scale:    ABCD2 Score:    [id number] Score:   HAS -BLED Score:   ICH Score:   Hunt & Barriga Classification:       Assessment/Plan:     Diagnostic Results      Brain Imaging   CT Head w/o contrast 8-2-18 results:  No acute intracranial abnormalities    Right parietal and occipital encephalomalacia suggesting remote infarcts. Some ex vacuo dilatation of the right lateral ventricle.    Atrophy.  Moderate confluent decreased supratentorial white matter attenuation most likely related to chronic nonspecific small vessel disease.     MRI Brain w/o contrast 8-3-18 results:    Multiple acute to  subacute infarcts in a right MCA distribution with partially occluding thrombus identified in the petrous segment of the right ICA.  Findings suggest embolic infarcts in the right MCA distribution.    Small focus of gyral enhancement in the right frontal lobe within the infarct, likely secondary to blood-barrier breakdown from recent infarct.    Advanced chronic microvascular ischemic change throughout the supratentorial white matter.     Vessel Imaging   MRA Brain and neck 8-3-18 results:  Multifocal occlusion of the right carotid circulation involving the proximal ICA at the carotid bifurcation as well as the petrous/cavernous segment of the distal right ICA.    Absence of flow within the left vertebral artery which appears new from concomitantly dated MRI brain and concerning for new thrombosis.     Cardiac Imaging   2D Echo 8-3-18 results:  The left atrial volume index is normal    1 - Low normal to mildly depressed left ventricular systolic function (EF 50-55%).     2 - Wall motion abnormalities.     3 - Quantitatively measured LV function is 48%.     4 - Concentric remodeling.     5 - Normal right ventricular systolic function .     6 - The estimated PA systolic pressure is 24 mmHg.     Interventions: None    Complications: None    Disposition: Home-Health Care [facility]    Final Active Diagnoses:    Diagnosis Date Noted POA    PRINCIPAL PROBLEM:  Embolic stroke involving right middle cerebral artery [I63.411] 08/03/2018 Yes    Cytotoxic cerebral edema [G93.6] 08/03/2018 Yes    CKD (chronic kidney disease), stage III [N18.3] 02/26/2014 Yes    HTN (hypertension) [I10] 10/24/2013 Yes    HIV infection [B20] 09/11/2013 Yes      Problems Resolved During this Admission:    Diagnosis Date Noted Date Resolved POA     No new Assessment & Plan notes have been filed under this hospital service since the last note was generated.  Service: Vascular Neurology      Recommendations:     Post-discharge complication risks:  Falls    Stroke Education given to: patient    Follow-up in Stroke Clinic in 30 days.     Discharge Plan:  Antithrombotics: Aspirin 81mg, Clopidogrel 75mg  Statin: Atorvastatin 80mg  Aggresive risk factor modification:  Hypertension  High Cholesterol    Follow Up:  Follow-up Information     Faith Henry MD.    Specialty:  Neurology  Why:  The office will call you to schedule an appt in 4-6 weeks. If the office does not call you by 8/13/18 please call to schedule an appt.  Contact information:  [address]  [facility] [address]  [phone]             Nat Haney PA-C On 8/17/2018.    Specialty:  Internal Medicine  Why:  appt at 1:00 pm  Contact information:  [address]  [facility] [address]  [phone]                   Patient Instructions:     Diet Cardiac   Order Comments: See Stroke Patient Education Guide Booklet for details.     Call 911 for any of the following:   Order Comments: Call 911  right away if any of the following warning signs come on suddenly, even if the symptoms only last for a few minutes. With stroke, timing is very important.   - Warning Signs of Stroke:  - Weakness: You may feel a sudden weakness, tingling or loss of feeling on one side of your face or body.  - Vision Problems: You may have sudden double vision or trouble seeing in one or both eyes.  - Speech Problems: You may have sudden trouble talking, slured speech, or problems understanding others.  - Headache: You may have sudden, severe headache.  - Movement Problems: You may experience dizziness, a feeling of spinning, a loss of balance, a feeling of falling or blackouts.         Medications:  Reconciled Home Medications:      Medication List      START taking these medications    aspirin 81 MG EC tablet  Commonly known as:  ECOTRIN  Take 1 tablet (81 mg total) by mouth once daily.     atorvastatin 80 MG tablet  Commonly known as:  LIPITOR  Take 1 tablet (80 mg total) by mouth once daily.  Start taking  on:  8/7/2018     clopidogrel 75 mg tablet  Commonly known as:  PLAVIX  Take 1 tablet (75 mg total) by mouth once daily.  Start taking on:  8/7/2018        CONTINUE taking these medications    amLODIPine 10 MG tablet  Commonly known as:  NORVASC  Take 1 tablet (10 mg total) by mouth once daily.     darunavir ethanolate 800 mg Tab  Commonly known as:  PREZISTA  Take 1 tablet (800 mg total) by mouth daily with breakfast.     ferrous sulfate 325 (65 FE) MG EC tablet  Take 1 tablet (325 mg total) by mouth once daily.     gabapentin 100 MG capsule  Commonly known as:  NEURONTIN  Take 1 capsule (100 mg total) by mouth 3 (three) times daily.     lamiVUDine 150 MG Tab  Commonly known as:  EPIVIR  Take 1 tablet (150 mg total) by mouth once daily.     meclizine 32 MG tablet  Commonly known as:  ANTIVERT  Take 12.5 mg by mouth 3 (three) times daily as needed.     ritonavir 100 mg Cap  Commonly known as:  NORVIR  Take 1 capsule (100 mg total) by mouth once daily.     sodium bicarbonate 325 MG tablet  Take 1 tablet (325 mg total) by mouth 3 (three) times daily.     sulfamethoxazole-trimethoprim 800-160mg 800-160 mg Tab  Commonly known as:  BACTRIM DS  Take 1 tablet by mouth 3 (three) times a week.     tenofovir 300 mg Tab  Commonly known as:  VIREAD  Take 1 tablet (300 mg total) by mouth every 48 hours.            Princess Bishop NP  [facility]  Department of Vascular Neurology   [facility]

## 2018-08-06 NOTE — SUBJECTIVE & OBJECTIVE
Neurologic Chief Complaint: left facial droop, multiple falls    Subjective:     Interval History: Patient is seen for follow-up neurological assessment and treatment recommendations: No acute issues or events overnight.  Heart range 51-71 bpm.  Remains asymptomatic.  Ready for discharge.    HPI, Past Medical, Family, and Social History remains the same as documented in the initial encounter.     Review of Systems   Constitutional: Negative for fever.   HENT: Negative for trouble swallowing.    Eyes: Negative for visual disturbance.   Respiratory: Negative for cough and shortness of breath.    Neurological: Positive for facial asymmetry and weakness. Negative for speech difficulty.     Scheduled Meds:   aspirin  81 mg Oral Daily    [START ON 8/7/2018] atorvastatin  80 mg Oral Daily    clopidogrel  75 mg Oral Daily    darunavir ethanolate  800 mg Oral Daily with breakfast    ferrous sulfate  325 mg Oral Daily    heparin (porcine)  5,000 Units Subcutaneous Q8H    potassium, sodium phosphates  1 packet Oral Once    sodium chloride 0.9%  3 mL Intravenous Q8H    sulfamethoxazole-trimethoprim 800-160mg  1 tablet Oral Once per day on Mon Wed Fri    tenofovir  300 mg Oral Q48H     Continuous Infusions:   sodium chloride 0.9%       PRN Meds:acetaminophen, labetalol, sodium chloride 0.9%    Objective:     Vital Signs (Most Recent):  Temp: 98.8 °F (37.1 °C) (08/06/18 1203)  Pulse: 61 (08/06/18 1203)  Resp: 18 (08/06/18 1203)  BP: (!) 144/76 (08/06/18 1203)  SpO2: 96 % (08/06/18 1203)  BP Location: Left arm    Vital Signs Range (Last 24H):  Temp:  [97.3 °F (36.3 °C)-98.8 °F (37.1 °C)]   Pulse:  [50-71]   Resp:  [12-18]   BP: (126-171)/(67-80)   SpO2:  [95 %-99 %]   BP Location: Left arm    Physical Exam   Constitutional: He appears well-developed and well-nourished. No distress.   Skin: Skin is warm and dry.   Nursing note and vitals reviewed.      Neurological Exam:   LOC: alert  Attention Span: Good   Language: No  aphasia  Articulation: No dysarthria  Orientation: Person, Place, Time   Visual Fields: Full  EOM (CN III, IV, VI): Full/intact  Pupils (CN II, III): PERRL  Facial Sensation (CN V): Normal  Facial Movement (CN VII): Lower facial weakness on the Left  Motor: Arm left  Paresis: 4/5  Leg left  Paresis: 4/5  Arm right  Normal 5/5  Leg right Normal 5/5  Sensation: Intact to light touch, temperature   Tone: Normal tone throughout    Laboratory:  CMP:   No results for input(s): GLUCOSE, CALCIUM, ALBUMIN, PROT, NA, K, CO2, CL, BUN, CREATININE, ALKPHOS, ALT, AST, BILITOT in the last 24 hours.  CBC:     Recent Labs  Lab 08/05/18  0538   WBC 5.03   RBC 5.16   HGB 15.0   HCT 44.3      MCV 86   MCH 29.1   MCHC 33.9     Lipid Panel:     Recent Labs  Lab 08/03/18 0459   CHOL 234*   LDLCALC 159.0   HDL 44   TRIG 155*     Coagulation:     Recent Labs  Lab 08/03/18 0459   INR 1.0   APTT 26.0     Platelet Aggregation Study: No results for input(s): PLTAGG, PLTAGINTERP, PLTAGREGLACO, ADPPLTAGGREG in the last 168 hours.  Hgb A1C:     Recent Labs  Lab 08/03/18 0459   HGBA1C 5.3     TSH:     Recent Labs  Lab 08/03/18 0459   TSH 0.953       Diagnostic Results     Brain Imaging   CT Head w/o contrast 8-2-18 results:  No acute intracranial abnormalities    Right parietal and occipital encephalomalacia suggesting remote infarcts. Some ex vacuo dilatation of the right lateral ventricle.    Atrophy.  Moderate confluent decreased supratentorial white matter attenuation most likely related to chronic nonspecific small vessel disease.    MRI Brain w/o contrast 8-3-18 results:    Multiple acute to subacute infarcts in a right MCA distribution with partially occluding thrombus identified in the petrous segment of the right ICA.  Findings suggest embolic infarcts in the right MCA distribution.    Small focus of gyral enhancement in the right frontal lobe within the infarct, likely secondary to blood-barrier breakdown from recent  infarct.    Advanced chronic microvascular ischemic change throughout the supratentorial white matter.    Vessel Imaging   MRA Brain and neck 8-3-18 results:  Multifocal occlusion of the right carotid circulation involving the proximal ICA at the carotid bifurcation as well as the petrous/cavernous segment of the distal right ICA.    Absence of flow within the left vertebral artery which appears new from concomitantly dated MRI brain and concerning for new thrombosis.    Cardiac Imaging   2D Echo 8-3-18 results:  The left atrial volume index is normal    1 - Low normal to mildly depressed left ventricular systolic function (EF 50-55%).     2 - Wall motion abnormalities.     3 - Quantitatively measured LV function is 48%.     4 - Concentric remodeling.     5 - Normal right ventricular systolic function .     6 - The estimated PA systolic pressure is 24 mmHg.

## 2018-08-06 NOTE — PLAN OF CARE
08/06/18 1639   Final Note   Assessment Type Final Discharge Note   Discharge Disposition Home-Health     Patient is discharged to home with [facility]. Sw to arrange HH. Patient's family will provide transportation home today. Rad made PCP appt with YOHANNES Haney on 8/17/18 at 1:00 pm. Cm sent an in basket message to Vascular Neurology for hospital follow up in 4-6 weeks.

## 2018-08-06 NOTE — PLAN OF CARE
DUC sent home health referral to [facility] for review.     1:20 PM  [facility] is unable to accept patient due to insurance. DUC sent referral to  [facility].     3:02 PM  Patient has been accepted by [facility].     Aditi Gonzales LMSW  [facility]  Ext. [phone]

## 2018-08-06 NOTE — PLAN OF CARE
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       No future appointments.  Follow-up Information     Faith Henry MD.    Specialty:  Neurology  Why:  The office will call you to schedule an appt in 4-6 weeks. If the office does not call you by 8/13/18 please call to schedule an appt.  Contact information:  [address] [place] [id number]  [phone]             Nat Haney PA-C On 8/17/2018.    Specialty:  Internal Medicine  Why:  appt at 1:00 pm  Contact information:  [address] [place] [address]  [phone]                     I have seen and examined this patient face to face today. My clinical findings that support the need for the home health skilled services and home bound status are the following:  Weakness/numbness causing balance and gait disturbance due to Stroke making it taxing to leave home.    Allergies:Review of patient's allergies indicates:  No Known Allergies    Diet: Mechanical Soft; thin liquids; cardiac    Activities: activity as tolerated    Nursing:   SN to complete comprehensive assessment including routine vital signs. Instruct on disease process and s/s of complications to report to MD. Review/verify medication list sent home with the patient at time of discharge  and instruct  patient/caregiver as needed. Frequency may be adjusted depending on start of care date.    Notify MD if SBP > 160 or < 90; DBP > 90 or < 50; HR > 120 or < 50; Temp > 101;       CONSULTS:    Physical Therapy to evaluate and treat. Evaluate for home safety and equipment needs; Establish/upgrade home exercise program. Perform / instruct on therapeutic exercises, gait training, transfer training, and Range of Motion.  Occupational Therapy to evaluate and treat. Evaluate home environment for safety and equipment needs. Perform/Instruct on transfers, ADL training, ROM, and therapeutic exercises.  Speech Therapy  to evaluate and treat for  Language, Swallowing and Cognition.   to evaluate for community resources/long-range planning.      Medications: Review discharge medications with patient and family and provide education.      Current Discharge Medication List      START taking these medications    Details   aspirin (ECOTRIN) 81 MG EC tablet Take 1 tablet (81 mg total) by mouth once daily.  Refills: 0      atorvastatin (LIPITOR) 80 MG tablet Take 1 tablet (80 mg total) by mouth once daily.  Qty: 90 tablet, Refills: 3      clopidogrel (PLAVIX) 75 mg tablet Take 1 tablet (75 mg total) by mouth once daily.  Qty: 30 tablet, Refills: 11         CONTINUE these medications which have NOT CHANGED    Details   darunavir (PREZISTA) 800 mg Tab Take 1 tablet (800 mg total) by mouth daily with breakfast.  Qty: 30 tablet, Refills: 6      ferrous sulfate 325 (65 FE) MG EC tablet Take 1 tablet (325 mg total) by mouth once daily.  Qty: 30 tablet, Refills: 5    Associated Diagnoses: Iron deficiency anemia      meclizine (ANTIVERT) 32 MG tablet Take 12.5 mg by mouth 3 (three) times daily as needed.      ritonavir (NORVIR) 100 mg Cap Take 1 capsule (100 mg total) by mouth once daily.  Qty: 240 capsule, Refills: 11      sulfamethoxazole-trimethoprim 800-160mg (BACTRIM DS) 800-160 mg Tab Take 1 tablet by mouth 3 (three) times a  week.  Qty: 30 tablet, Refills: 1      tenofovir (VIREAD) 300 mg Tab Take 1 tablet (300 mg total) by mouth every 48 hours.  Qty: 30 tablet, Refills: 6      amlodipine (NORVASC) 10 MG tablet Take 1 tablet (10 mg total) by mouth once daily.  Qty: 30 tablet, Refills: 2      gabapentin (NEURONTIN) 100 MG capsule Take 1 capsule (100 mg total) by mouth 3 (three) times daily.  Qty: 90 capsule, Refills: 11    Associated Diagnoses: Neuropathic pain; Pain of left upper extremity      lamivudine (EPIVIR) 150 MG Tab Take 1 tablet (150 mg total) by mouth once daily.  Qty: 30 tablet, Refills: 11      sodium bicarbonate 325 MG tablet Take 1 tablet (325 mg total) by mouth 3 (three) times daily.  Qty: 90 tablet, Refills: 11    Associated Diagnoses: Human immunodeficiency virus (HIV) disease; Renal insufficiency; HBP (high blood pressure); AIDS (acquired immunodeficiency syndrome), CD4 <=200; LIZETTE (acute kidney injury); Acute on chronic kidney disease, stage 3             I certify that this patient is confined to his home and needs intermittent skilled nursing care, physical therapy, speech therapy and occupational therapy.

## 2018-08-06 NOTE — SUBJECTIVE & OBJECTIVE
Interval History 8/6/2018:  Patient is seen for follow-up rehab evaluation and recommendations: No acute events over night.  Without complaints.  Participating with therapy.     HPI, Past Medical, Family, and Social History remains the same as documented in the initial encounter.    Scheduled Medications:    aspirin  81 mg Oral Daily    atorvastatin  40 mg Oral Daily    clopidogrel  75 mg Oral Daily    darunavir ethanolate  800 mg Oral Daily with breakfast    ferrous sulfate  325 mg Oral Daily    heparin (porcine)  5,000 Units Subcutaneous Q8H    potassium, sodium phosphates  1 packet Oral Once    sodium chloride 0.9%  3 mL Intravenous Q8H    sulfamethoxazole-trimethoprim 800-160mg  1 tablet Oral Once per day on Mon Wed Fri    tenofovir  300 mg Oral Q48H     PRN Medications: acetaminophen, labetalol, sodium chloride 0.9%    Review of Systems   Constitutional: Negative for chills, fatigue and fever.   HENT: Negative for drooling, hearing loss, trouble swallowing and voice change.    Eyes: Negative for pain and visual disturbance.   Respiratory: Negative for cough, shortness of breath and wheezing.    Cardiovascular: Negative for chest pain and palpitations.   Gastrointestinal: Negative for abdominal pain, nausea and vomiting.   Genitourinary: Negative for difficulty urinating and flank pain.   Musculoskeletal: Negative for arthralgias, back pain, myalgias and neck pain.   Skin: Negative for rash and wound.   Neurological: Positive for weakness. Negative for dizziness, numbness and headaches.   Psychiatric/Behavioral: Negative for agitation and hallucinations. The patient is not nervous/anxious.      Objective:     Vital Signs (Most Recent):  Temp: 98 °F (36.7 °C) (08/06/18 0940)  Pulse: 62 (08/06/18 0940)  Resp: 18 (08/06/18 0940)  BP: 131/67 (08/06/18 0940)  SpO2: 96 % (08/06/18 0940)    Vital Signs (24h Range):  Temp:  [97.3 °F (36.3 °C)-98.6 °F (37 °C)] 98 °F (36.7 °C)  Pulse:  [50-71] 62  Resp:  [12-18]  18  SpO2:  [95 %-99 %] 96 %  BP: (126-171)/(67-80) 131/67     Physical Exam   Constitutional: He is oriented to person, place, and time. He appears well-developed and well-nourished. No distress.   HENT:   Head: Normocephalic and atraumatic.   Right Ear: External ear normal.   Left Ear: External ear normal.   Nose: Nose normal.   Eyes: Right eye exhibits no discharge. Left eye exhibits no discharge. No scleral icterus.   Neck: Normal range of motion.   Cardiovascular: Normal rate, regular rhythm and intact distal pulses.    Pulmonary/Chest: Effort normal. No respiratory distress. He has no wheezes.   Abdominal: Soft. He exhibits no distension. There is no tenderness.   Musculoskeletal: Normal range of motion. He exhibits no edema or tenderness.   Neurological: He is alert and oriented to person, place, and time. He exhibits normal muscle tone.   -  Mental Status:  AAOx3.  Follows commands.  Answers correct age and .  Recent and remote memory intact.  -  Speech and language:  no aphasia or dysarthria.    -  Vision:  no hemianopsia or ptosis.    -  Facial movement (CN VII): mild facial droop.  -  Motor:  RUE: 5/5.  LUE: 4/5.  RLE: 5/5.  LLE: 5-/5.  -  Tone:  Increased LUE.  -  Sensory:  Intact to light touch and pin prick.   Skin: Skin is warm and dry. No rash noted.   Psychiatric: He has a normal mood and affect. His behavior is normal. Thought content normal.   Vitals reviewed.    Diagnostic Results:   Labs: Reviewed  X-Ray: Reviewed  CT: Reviewed  MRI: Reviewed    NEUROLOGICAL EXAMINATION:     MENTAL STATUS   Oriented to person, place, and time.

## 2018-08-14 NOTE — PT/OT/SLP DISCHARGE
Physical Therapy Discharge Summary    Name: Benjamin Zelaya  MRN: [id number]   Principal Problem: Embolic stroke involving right middle cerebral artery     Patient Discharged from acute Physical Therapy on 8/3/2018.  Please refer to prior PT noted date on 8/3/2018 for functional status.     Assessment:     Patient appropriate for care in another setting.    Objective:     GOALS:   Multidisciplinary Problems     Physical Therapy Goals        Problem: Physical Therapy Goal    Goal Priority Disciplines Outcome Goal Variances Interventions   Physical Therapy Goal     PT, PT/OT Ongoing (interventions implemented as appropriate)     Description:    Goals to be met by 8/17/2018    1. Pt will perform sit to stand transfers with independence.    2. Pt will perform bed <> chair transfers with independence.  3. Pt will perform gait x 150 feet with independence using L head turns to compensate for visual deficit.  4. Pt will ascend and descend 5 steps with modified independence using 1 rail to safely access home environment.   5. Pt will improve balance to >24/28 on Tinetti Balance Scale to decrease risk for falls in the home environment.   6. Pt will improve dynamic gait to >19/24 to enable safe ambulation in community environments.                     Reasons for Discontinuation of Therapy Services  Transfer to alternate level of care.      Plan:     Patient Discharged to: Outpatient Therapy Services.    Neetu Chatterjee, PT  8/14/2018

## 2018-08-28 ENCOUNTER — TELEPHONE (OUTPATIENT)
Dept: NEUROLOGY | Facility: CLINIC | Age: 73
End: 2018-08-28

## 2018-08-28 ENCOUNTER — TELEPHONE (OUTPATIENT)
Dept: ADMINISTRATIVE | Facility: CLINIC | Age: 73
End: 2018-08-28

## 2018-08-28 NOTE — TELEPHONE ENCOUNTER
Called twice to schedule patient for f/u with Stroke doctor. Wasn't able to reach anyone or leave a message.

## 2018-09-06 ENCOUNTER — TELEPHONE (OUTPATIENT)
Dept: NEUROLOGY | Facility: CLINIC | Age: 73
End: 2018-09-06

## 2018-09-06 NOTE — TELEPHONE ENCOUNTER
Called patient several times and number on demographics was unable to reach patient or anyone to schedule f/u appt. With stroke doctor. I was not able to leave a  Message either for patient.